# Patient Record
Sex: FEMALE | Race: BLACK OR AFRICAN AMERICAN | Employment: FULL TIME | URBAN - METROPOLITAN AREA
[De-identification: names, ages, dates, MRNs, and addresses within clinical notes are randomized per-mention and may not be internally consistent; named-entity substitution may affect disease eponyms.]

---

## 2022-10-02 ENCOUNTER — HOSPITAL ENCOUNTER (EMERGENCY)
Age: 27
Discharge: HOME | End: 2022-10-02
Attending: EMERGENCY MEDICINE
Payer: COMMERCIAL

## 2022-10-02 VITALS
HEART RATE: 84 BPM | OXYGEN SATURATION: 100 % | TEMPERATURE: 99 F | WEIGHT: 107 LBS | HEIGHT: 64 IN | BODY MASS INDEX: 18.27 KG/M2 | DIASTOLIC BLOOD PRESSURE: 61 MMHG | RESPIRATION RATE: 16 BRPM | SYSTOLIC BLOOD PRESSURE: 95 MMHG

## 2022-10-02 DIAGNOSIS — R10.30 LOWER ABDOMINAL PAIN: Primary | ICD-10-CM

## 2022-10-02 LAB
ALBUMIN SERPL-MCNC: 3.9 G/DL (ref 3.5–5)
ALBUMIN/GLOB SERPL: 1 {RATIO} (ref 1.2–3.5)
ALP SERPL-CCNC: 62 U/L (ref 50–136)
ALT SERPL-CCNC: 31 U/L (ref 12–65)
ANION GAP SERPL CALC-SCNC: 6 MMOL/L (ref 4–13)
AST SERPL-CCNC: 21 U/L (ref 15–37)
BASOPHILS # BLD: 0 K/UL (ref 0–0.2)
BASOPHILS NFR BLD: 0 % (ref 0–2)
BILIRUB SERPL-MCNC: 0.4 MG/DL (ref 0.2–1.1)
BUN SERPL-MCNC: 9 MG/DL (ref 6–23)
CALCIUM SERPL-MCNC: 9 MG/DL (ref 8.3–10.4)
CHLORIDE SERPL-SCNC: 109 MMOL/L (ref 101–110)
CO2 SERPL-SCNC: 29 MMOL/L (ref 21–32)
CREAT SERPL-MCNC: 0.6 MG/DL (ref 0.6–1)
DIFFERENTIAL METHOD BLD: ABNORMAL
EOSINOPHIL # BLD: 0.2 K/UL (ref 0–0.8)
EOSINOPHIL NFR BLD: 3 % (ref 0.5–7.8)
ERYTHROCYTE [DISTWIDTH] IN BLOOD BY AUTOMATED COUNT: 11.9 % (ref 11.9–14.6)
GLOBULIN SER CALC-MCNC: 3.9 G/DL (ref 2.3–3.5)
GLUCOSE SERPL-MCNC: 78 MG/DL (ref 65–100)
HCG UR QL: NEGATIVE
HCT VFR BLD AUTO: 36.9 % (ref 35.8–46.3)
HGB BLD-MCNC: 11.6 G/DL (ref 11.7–15.4)
IMM GRANULOCYTES # BLD AUTO: 0 K/UL (ref 0–0.5)
IMM GRANULOCYTES NFR BLD AUTO: 0 % (ref 0–5)
LIPASE SERPL-CCNC: 70 U/L (ref 73–393)
LYMPHOCYTES # BLD: 3 K/UL (ref 0.5–4.6)
LYMPHOCYTES NFR BLD: 55 % (ref 13–44)
MCH RBC QN AUTO: 24.4 PG (ref 26.1–32.9)
MCHC RBC AUTO-ENTMCNC: 31.4 G/DL (ref 31.4–35)
MCV RBC AUTO: 77.7 FL (ref 79.6–97.8)
MONOCYTES # BLD: 0.4 K/UL (ref 0.1–1.3)
MONOCYTES NFR BLD: 8 % (ref 4–12)
NEUTS SEG # BLD: 1.8 K/UL (ref 1.7–8.2)
NEUTS SEG NFR BLD: 34 % (ref 43–78)
NRBC # BLD: 0 K/UL (ref 0–0.2)
PLATELET # BLD AUTO: 96 K/UL (ref 150–450)
PLATELET COMMENT: ABNORMAL
PMV BLD AUTO: 11.8 FL (ref 9.4–12.3)
POTASSIUM SERPL-SCNC: 3.6 MMOL/L (ref 3.5–5.1)
PROT SERPL-MCNC: 7.8 G/DL (ref 6.3–8.2)
RBC # BLD AUTO: 4.75 M/UL (ref 4.05–5.2)
RBC MORPH BLD: ABNORMAL
RBC MORPH BLD: ABNORMAL
SODIUM SERPL-SCNC: 144 MMOL/L (ref 136–145)
WBC # BLD AUTO: 5.4 K/UL (ref 4.3–11.1)
WBC MORPH BLD: ABNORMAL

## 2022-10-02 PROCEDURE — 85025 COMPLETE CBC W/AUTO DIFF WBC: CPT

## 2022-10-02 PROCEDURE — 99284 EMERGENCY DEPT VISIT MOD MDM: CPT

## 2022-10-02 PROCEDURE — 83690 ASSAY OF LIPASE: CPT

## 2022-10-02 PROCEDURE — 80053 COMPREHEN METABOLIC PANEL: CPT

## 2022-10-02 PROCEDURE — 81025 URINE PREGNANCY TEST: CPT

## 2022-10-02 PROCEDURE — 6360000002 HC RX W HCPCS: Performed by: EMERGENCY MEDICINE

## 2022-10-02 PROCEDURE — 96374 THER/PROPH/DIAG INJ IV PUSH: CPT

## 2022-10-02 RX ORDER — KETOROLAC TROMETHAMINE 30 MG/ML
30 INJECTION, SOLUTION INTRAMUSCULAR; INTRAVENOUS ONCE
Status: COMPLETED | OUTPATIENT
Start: 2022-10-02 | End: 2022-10-02

## 2022-10-02 RX ORDER — KETOROLAC TROMETHAMINE 10 MG/1
10 TABLET, FILM COATED ORAL EVERY 6 HOURS PRN
Qty: 20 TABLET | Refills: 0 | Status: SHIPPED | OUTPATIENT
Start: 2022-10-02

## 2022-10-02 RX ADMIN — KETOROLAC TROMETHAMINE 30 MG: 30 INJECTION, SOLUTION INTRAMUSCULAR at 10:20

## 2022-10-02 ASSESSMENT — PAIN DESCRIPTION - LOCATION: LOCATION: ABDOMEN

## 2022-10-02 ASSESSMENT — PAIN - FUNCTIONAL ASSESSMENT: PAIN_FUNCTIONAL_ASSESSMENT: 0-10

## 2022-10-02 ASSESSMENT — PAIN SCALES - GENERAL
PAINLEVEL_OUTOF10: 7
PAINLEVEL_OUTOF10: 6

## 2022-10-02 ASSESSMENT — ENCOUNTER SYMPTOMS
ABDOMINAL PAIN: 1
RESPIRATORY NEGATIVE: 1

## 2022-10-02 ASSESSMENT — PAIN DESCRIPTION - ORIENTATION: ORIENTATION: LOWER

## 2022-10-02 ASSESSMENT — PAIN DESCRIPTION - DESCRIPTORS: DESCRIPTORS: CRAMPING

## 2022-10-02 NOTE — DISCHARGE INSTRUCTIONS
Follow-up with your doctor. take medicines as prescribed. Return to emergency department for any concerns.

## 2022-10-02 NOTE — ED TRIAGE NOTES
Pt arrives via pov with complaints of lower abdominal pain. LMP last week. Denies N/V/D. Recent miscarriage in July with D&C.

## 2022-10-02 NOTE — ED NOTES
I have reviewed discharge instructions with the patient. The patient verbalized understanding. Patient left ED via Discharge Method: ambulatory to Home with self    Opportunity for questions and clarification provided. Patient given 1 scripts. No esign        To continue your aftercare when you leave the hospital, you may receive an automated call from our care team to check in on how you are doing. This is a free service and part of our promise to provide the best care and service to meet your aftercare needs.  If you have questions, or wish to unsubscribe from this service please call [phone]. Thank you for Choosing our [facility] Emergency Department.       Atif Pérez RN  10/02/22 9606

## 2022-10-02 NOTE — ED PROVIDER NOTES
Emergency Department Provider Note                   PCP:                NOT ON FILE               Age: 32 y.o. Sex: female       ICD-10-CM    1. Lower abdominal pain  R10.30           DISPOSITION Decision To Discharge 10/02/2022 11:08:56 AM        MDM  Number of Diagnoses or Management Options  Diagnosis management comments: 19-year-old -American female present emergency department with lower abdominal cramping that been intermittent for the past month. Patient with no pain upon evaluation today. She states that it does not hurt for palpation but she does have some mild cramps at this time. She was given IV Toradol which completely resolved her symptoms. Patient will be discharged home with p.o. Toradol. She will return to the emergency department for any concerns. Amount and/or Complexity of Data Reviewed  Clinical lab tests: ordered and reviewed  Decide to obtain previous medical records or to obtain history from someone other than the patient: yes  Review and summarize past medical records: yes    Patient Progress  Patient progress: improved       ED Course as of 10/02/22 1110   Sun Oct 02, 2022   1105 Patient is feeling better.  [JL]      ED Course User Index  [JL] Lilibeth Houser MD        Orders Placed This Encounter   Procedures    CBC with Diff    CMP    Lipase    Diet NPO    POCT Urine Dipstick    POC Pregnancy Urine Qual    POC Pregnancy Urine Qual    Saline lock IV        Medications   ketorolac (TORADOL) injection 30 mg (30 mg IntraVENous Given 10/2/22 1020)       New Prescriptions    KETOROLAC (TORADOL) 10 MG TABLET    Take 1 tablet by mouth every 6 hours as needed for Pain        Warren Vilchis is a 32 y.o. female who presents to the Emergency Department with chief complaint of    Chief Complaint   Patient presents with    Abdominal Pain      19-year-old -American female presented to the emergency department with complaints of lower abdominal cramping that has been intermittent for the past month. Patient states that she had a miscarriage 4 months ago and she has had intermittent cramping since then. With her miscarriage she had a D&C and she has fully recovered from that. Patient states that she just finished her menstrual period yesterday and she has no complaints of vaginal bleeding, vaginal discharge, pelvic pain. She denies any changes in bowel or bladder habits. She states that she has been seen by her family doctor and had a pelvic exam and had a negative STD work-up at that time. She then was seen at [facility] and had a normal work-up there as well. She was given ibuprofen and Levsin for cramping symptoms but they do not seem to help. He has no other complaints at this time. The history is provided by the patient and medical records. Review of Systems   Constitutional: Negative. HENT: Negative. Respiratory: Negative. Cardiovascular: Negative. Gastrointestinal:  Positive for abdominal pain. Genitourinary:  Positive for pelvic pain. Musculoskeletal: Negative. Neurological: Negative. Psychiatric/Behavioral: Negative. All other systems reviewed and are negative. Past Medical History:   Diagnosis Date    Sickle cell anemia ([facility] [address]         History reviewed. No pertinent surgical history. History reviewed. No pertinent family history. Social History     Socioeconomic History    Marital status:      Spouse name: None    Number of children: None    Years of education: None    Highest education level: None   Tobacco Use    Smoking status: Never    Smokeless tobacco: Never   Substance and Sexual Activity    Alcohol use: Never    Drug use: Never         Patient has no known allergies. Previous Medications    No medications on file        Vitals signs and nursing note reviewed.    Patient Vitals for the past 4 hrs:   Temp Pulse Resp BP SpO2   10/02/22 0940 99 °F (37.2 °C) 84 16 123/77 100 %          Physical Exam  Vitals and nursing note reviewed. Constitutional:       General: She is not in acute distress. Appearance: She is well-developed. She is not ill-appearing or toxic-appearing. HENT:      Head: Normocephalic and atraumatic. Mouth/Throat:      Mouth: Mucous membranes are moist.   Cardiovascular:      Rate and Rhythm: Normal rate and regular rhythm. Heart sounds: Normal heart sounds. Pulmonary:      Effort: Pulmonary effort is normal.      Breath sounds: Normal breath sounds. Abdominal:      General: Abdomen is flat. Bowel sounds are normal.      Palpations: Abdomen is soft. Tenderness: There is no abdominal tenderness. Skin:     General: Skin is warm and dry. Neurological:      General: No focal deficit present. Mental Status: She is alert and oriented to person, place, and time.    Psychiatric:         Mood and Affect: Mood normal.         Behavior: Behavior normal.            Results for orders placed or performed during the hospital encounter of 10/02/22   CBC with Diff   Result Value Ref Range    WBC 5.4 4.3 - 11.1 K/uL    RBC 4.75 4.05 - 5.2 M/uL    Hemoglobin 11.6 (L) 11.7 - 15.4 g/dL    Hematocrit 36.9 35.8 - 46.3 %    MCV 77.7 (L) 79.6 - 97.8 FL    MCH 24.4 (L) 26.1 - 32.9 PG    MCHC 31.4 31.4 - 35.0 g/dL    RDW 11.9 11.9 - 14.6 %    Platelets 96 (L) 986 - 450 K/uL    MPV 11.8 9.4 - 12.3 FL    nRBC 0.00 0.0 - 0.2 K/uL    Differential Type AUTOMATED      Seg Neutrophils 34 (L) 43 - 78 %    Lymphocytes 55 (H) 13 - 44 %    Monocytes 8 4.0 - 12.0 %    Eosinophils % 3 0.5 - 7.8 %    Basophils 0 0.0 - 2.0 %    Immature Granulocytes 0 0.0 - 5.0 %    Segs Absolute 1.9 1.7 - 8.2 K/UL    Absolute Lymph # 3.0 0.5 - 4.6 K/UL    Absolute Mono # 0.4 0.1 - 1.3 K/UL    Absolute Eos # 0.1 0.0 - 0.8 K/UL    Basophils Absolute 0.0 0.0 - 0.2 K/UL    Absolute Immature Granulocyte 0.0 0.0 - 0.5 K/UL   CMP   Result Value Ref Range    Sodium 144 136 - 145 mmol/L    Potassium 3.6 3.5 - 5.1 mmol/L    Chloride 109 101 - 110 mmol/L    CO2 29 21 - 32 mmol/L    Anion Gap 6 4 - 13 mmol/L    Glucose 78 65 - 100 mg/dL    BUN 9 6 - 23 MG/DL    Creatinine 0.60 0.6 - 1.0 MG/DL    GFR African American >60 >60 ml/min/1.73m2    GFR Non- >60 >60 ml/min/1.73m2    Calcium 9.0 8.3 - 10.4 MG/DL    Total Bilirubin 0.4 0.2 - 1.1 MG/DL    ALT 31 12 - 65 U/L    AST 21 15 - 37 U/L    Alk Phosphatase 62 50 - 136 U/L    Total Protein 7.8 6.3 - 8.2 g/dL    Albumin 3.9 3.5 - 5.0 g/dL    Globulin 3.9 (H) 2.3 - 3.5 g/dL    Albumin/Globulin Ratio 1.0 (L) 1.2 - 3.5     Lipase   Result Value Ref Range    Lipase 70 (L) 73 - 393 U/L   POC Pregnancy Urine Qual   Result Value Ref Range    Preg Test, Ur Negative NEG          No orders to display                       Voice dictation software was used during the making of this note. This software is not perfect and grammatical and other typographical errors may be present. This note has not been completely proofread for errors.        Leo Chaidez MD  10/02/22 1363

## 2023-12-14 ENCOUNTER — OFFICE VISIT (OUTPATIENT)
Dept: ORTHOPEDIC SURGERY | Age: 28
End: 2023-12-14
Payer: COMMERCIAL

## 2023-12-14 DIAGNOSIS — M79.672 LEFT FOOT PAIN: ICD-10-CM

## 2023-12-14 DIAGNOSIS — M25.571 CHRONIC PAIN OF BOTH ANKLES: ICD-10-CM

## 2023-12-14 DIAGNOSIS — M25.572 CHRONIC PAIN OF BOTH ANKLES: ICD-10-CM

## 2023-12-14 DIAGNOSIS — G89.29 CHRONIC PAIN OF BOTH ANKLES: ICD-10-CM

## 2023-12-14 DIAGNOSIS — M79.671 RIGHT FOOT PAIN: Primary | ICD-10-CM

## 2023-12-14 PROCEDURE — 99204 OFFICE O/P NEW MOD 45 MIN: CPT | Performed by: ORTHOPAEDIC SURGERY

## 2023-12-14 NOTE — PROGRESS NOTES
biggest issue standing and walking are right plantarlateral foot pain, bent knees and somewhat crouch gait  Her biggest issue with day-to-day activity is plantar lateral right foot pain  She failed insoles from [employer]  She has enough changes in her lateral column plantar lateral foot overload on x-ray that we discussed revision surgery  She understands my partner will have to give his opinion on whether he feels surgery will help her    Advanced medical imaging: Will leave CT scan versus MRI scan up to partner   We discussed care and protection  PT: She completed PT after her prior treatment [facility] [place] [facility]  Orthotic/prosthetic: She reports seeing [employer] for custom insoles that did not relieve her pain       Medication - OTC meds prn:   Surgical discussion: She understands that due to my upper extremity conditions, the need to see a surgical partner for surgery. I outlined surgery with certain risks/complications and expected post-op course. My opined surgical recommendation and surgical discussion may not align exactly with my surgical partner's opinion; therefore, my partner's recommendation is what should be accepted and followed. Surgery: Right revision foot surgery: Arch reconstruction: Triple arthrodesis: Fifth tarsometatarsal decompression: Balderrama-tenderness Achilles recession      Follow up: Surgical partner  Work status: Regular     This note was created using Dragon voice recognition software which may result in errors of speech and spelling recognition and word/phrase syntax errors.

## 2023-12-27 ENCOUNTER — CLINICAL DOCUMENTATION (OUTPATIENT)
Dept: ORTHOPEDIC SURGERY | Age: 28
End: 2023-12-27

## 2023-12-28 DIAGNOSIS — M76.821 TIBIALIS TENDINITIS OF BOTH LOWER EXTREMITIES: Primary | ICD-10-CM

## 2023-12-28 DIAGNOSIS — M76.822 TIBIALIS TENDINITIS OF BOTH LOWER EXTREMITIES: Primary | ICD-10-CM

## 2023-12-28 DIAGNOSIS — G80.1 SPASTIC DIPLEGIC CEREBRAL PALSY (HCC): ICD-10-CM

## 2024-01-02 ENCOUNTER — CLINICAL DOCUMENTATION (OUTPATIENT)
Dept: ORTHOPEDIC SURGERY | Age: 29
End: 2024-01-02

## 2024-01-09 RX ORDER — PROPRANOLOL HCL 60 MG
CAPSULE, EXTENDED RELEASE 24HR ORAL
COMMUNITY
Start: 2023-12-15

## 2024-01-09 NOTE — PERIOP NOTE
Patient verified name and .  Order for consent not found in EHR patient verifies procedure.   Type 1B surgery, Phone assessment complete.  Orders Not received.  Labs per surgeon: none  Labs per anesthesia protocol: none    Patient answered medical/surgical history questions at their best of ability. All prior to admission medications documented in EPIC.  Patient instructed to take the following medications the day of surgery according to anesthesia guidelines with a small sip of water: Propranolol (Inderal),  On the day before surgery please take 2 Tylenol in the morning and then again before bed. You may use either regular or extra strength.   Hold all vitamins 7 days prior to surgery and NSAIDS 5 days prior to surgery. Prescription meds to hold:None  Patient instructed on the following:    > Arrive at [facility]PC Entrance, time of arrival to be called the day before by 1700  > NPO after midnight, unless otherwise indicated, including gum, mints, and ice chips  > Responsible adult must drive patient to the hospital, stay during surgery, and patient will need supervision 24 hours after anesthesia  > Use non moisturizing soap in shower the night before surgery and on the morning of surgery  > All piercings must be removed prior to arrival.    > Leave all valuables (money and jewelry) at home but bring insurance card and ID on DOS.   > You may be required to pay a deductible or co-pay on the day of your procedure. You can pre-pay by calling [phone] if your surgery is at the [facility] or [phone] if your surgery is at the [facility].  > Do not wear make-up, nail polish, lotions, cologne, perfumes, powders, or oil on skin. Artificial nails are not permitted.

## 2024-01-10 ENCOUNTER — ANESTHESIA EVENT (OUTPATIENT)
Dept: SURGERY | Age: 29
End: 2024-01-10
Payer: COMMERCIAL

## 2024-01-10 NOTE — PERIOP NOTE
Preop department called to notify patient of arrival time for scheduled procedure. Instructions given to   - Arrive at OPC Entrance [address]  - Remain NPO after midnight, unless otherwise indicated, including gum, mints, and ice chips.   - Have a responsible adult to drive patient to the hospital, stay during surgery, and patient will need supervision 24 hours after anesthesia.   - Use antibacterial soap in shower the night before surgery and on the morning of surgery.       Was patient contacted: yes, pt  Voicemail left: n/a  Numbers contacted: [phone]   Arrival time: 1100

## 2024-01-11 ENCOUNTER — HOSPITAL ENCOUNTER (OUTPATIENT)
Age: 29
Setting detail: OUTPATIENT SURGERY
Discharge: HOME | End: 2024-01-11
Attending: ORTHOPAEDIC SURGERY | Admitting: ORTHOPAEDIC SURGERY
Payer: COMMERCIAL

## 2024-01-11 ENCOUNTER — ANESTHESIA (OUTPATIENT)
Dept: SURGERY | Age: 29
End: 2024-01-11
Payer: COMMERCIAL

## 2024-01-11 ENCOUNTER — APPOINTMENT (OUTPATIENT)
Dept: GENERAL RADIOLOGY | Age: 29
End: 2024-01-11
Attending: ORTHOPAEDIC SURGERY
Payer: COMMERCIAL

## 2024-01-11 VITALS
TEMPERATURE: 98.1 F | HEIGHT: 64 IN | RESPIRATION RATE: 13 BRPM | DIASTOLIC BLOOD PRESSURE: 92 MMHG | SYSTOLIC BLOOD PRESSURE: 145 MMHG | HEART RATE: 60 BPM | OXYGEN SATURATION: 100 % | BODY MASS INDEX: 19.29 KG/M2 | WEIGHT: 113 LBS

## 2024-01-11 DIAGNOSIS — G89.18 ACUTE POST-OPERATIVE PAIN: Primary | ICD-10-CM

## 2024-01-11 LAB — HCG UR QL: NEGATIVE

## 2024-01-11 PROCEDURE — 3700000000 HC ANESTHESIA ATTENDED CARE: Performed by: ORTHOPAEDIC SURGERY

## 2024-01-11 PROCEDURE — 3600000014 HC SURGERY LEVEL 4 ADDTL 15MIN: Performed by: ORTHOPAEDIC SURGERY

## 2024-01-11 PROCEDURE — 2580000003 HC RX 258: Performed by: ANESTHESIOLOGY

## 2024-01-11 PROCEDURE — 2720000010 HC SURG SUPPLY STERILE: Performed by: ORTHOPAEDIC SURGERY

## 2024-01-11 PROCEDURE — 2500000003 HC RX 250 WO HCPCS: Performed by: NURSE ANESTHETIST, CERTIFIED REGISTERED

## 2024-01-11 PROCEDURE — C1734 ORTH/DEVIC/DRUG BN/BN,TIS/BN: HCPCS | Performed by: ORTHOPAEDIC SURGERY

## 2024-01-11 PROCEDURE — 7100000010 HC PHASE II RECOVERY - FIRST 15 MIN: Performed by: ORTHOPAEDIC SURGERY

## 2024-01-11 PROCEDURE — 3600000004 HC SURGERY LEVEL 4 BASE: Performed by: ORTHOPAEDIC SURGERY

## 2024-01-11 PROCEDURE — 6360000002 HC RX W HCPCS: Performed by: ANESTHESIOLOGY

## 2024-01-11 PROCEDURE — 7100000000 HC PACU RECOVERY - FIRST 15 MIN: Performed by: ORTHOPAEDIC SURGERY

## 2024-01-11 PROCEDURE — 3700000001 HC ADD 15 MINUTES (ANESTHESIA): Performed by: ORTHOPAEDIC SURGERY

## 2024-01-11 PROCEDURE — C1889 IMPLANT/INSERT DEVICE, NOC: HCPCS | Performed by: ORTHOPAEDIC SURGERY

## 2024-01-11 PROCEDURE — 2709999900 HC NON-CHARGEABLE SUPPLY: Performed by: ORTHOPAEDIC SURGERY

## 2024-01-11 PROCEDURE — 6360000002 HC RX W HCPCS

## 2024-01-11 PROCEDURE — 6360000002 HC RX W HCPCS: Performed by: NURSE ANESTHETIST, CERTIFIED REGISTERED

## 2024-01-11 PROCEDURE — 64445 NJX AA&/STRD SCIATIC NRV IMG: CPT | Performed by: ANESTHESIOLOGY

## 2024-01-11 PROCEDURE — 64447 NJX AA&/STRD FEMORAL NRV IMG: CPT | Performed by: ANESTHESIOLOGY

## 2024-01-11 PROCEDURE — 6360000002 HC RX W HCPCS: Performed by: NURSE PRACTITIONER

## 2024-01-11 PROCEDURE — 81025 URINE PREGNANCY TEST: CPT

## 2024-01-11 PROCEDURE — C1769 GUIDE WIRE: HCPCS | Performed by: ORTHOPAEDIC SURGERY

## 2024-01-11 PROCEDURE — 7100000001 HC PACU RECOVERY - ADDTL 15 MIN: Performed by: ORTHOPAEDIC SURGERY

## 2024-01-11 PROCEDURE — C1713 ANCHOR/SCREW BN/BN,TIS/BN: HCPCS | Performed by: ORTHOPAEDIC SURGERY

## 2024-01-11 DEVICE — GRAFT BNE INJ 3 CC AUG: Type: IMPLANTABLE DEVICE | Site: FOOT | Status: FUNCTIONAL

## 2024-01-11 DEVICE — ALLOGRAFT BNE CHIP 1-4 MM 15 CC CRUSH CANC: Type: IMPLANTABLE DEVICE | Site: FOOT | Status: FUNCTIONAL

## 2024-01-11 DEVICE — IMPLANTABLE DEVICE
Type: IMPLANTABLE DEVICE | Site: FOOT | Status: FUNCTIONAL
Brand: EASYFUSE™

## 2024-01-11 DEVICE — CANNULATED SCREW - 16MM THREAD
Type: IMPLANTABLE DEVICE | Site: FOOT | Status: FUNCTIONAL
Brand: AXSOS 3

## 2024-01-11 RX ORDER — HYDROMORPHONE HYDROCHLORIDE 2 MG/ML
0.5 INJECTION, SOLUTION INTRAMUSCULAR; INTRAVENOUS; SUBCUTANEOUS EVERY 10 MIN PRN
Status: DISCONTINUED | OUTPATIENT
Start: 2024-01-11 | End: 2024-01-11 | Stop reason: HOSPADM

## 2024-01-11 RX ORDER — PROPOFOL 10 MG/ML
INJECTION, EMULSION INTRAVENOUS CONTINUOUS PRN
Status: DISCONTINUED | OUTPATIENT
Start: 2024-01-11 | End: 2024-01-11 | Stop reason: SDUPTHER

## 2024-01-11 RX ORDER — EPHEDRINE SULFATE/0.9% NACL/PF 50 MG/5 ML
SYRINGE (ML) INTRAVENOUS PRN
Status: DISCONTINUED | OUTPATIENT
Start: 2024-01-11 | End: 2024-01-11 | Stop reason: SDUPTHER

## 2024-01-11 RX ORDER — SODIUM CHLORIDE 9 MG/ML
INJECTION, SOLUTION INTRAVENOUS PRN
Status: DISCONTINUED | OUTPATIENT
Start: 2024-01-11 | End: 2024-01-11 | Stop reason: HOSPADM

## 2024-01-11 RX ORDER — IPRATROPIUM BROMIDE AND ALBUTEROL SULFATE 2.5; .5 MG/3ML; MG/3ML
1 SOLUTION RESPIRATORY (INHALATION)
Status: DISCONTINUED | OUTPATIENT
Start: 2024-01-11 | End: 2024-01-11 | Stop reason: HOSPADM

## 2024-01-11 RX ORDER — ASPIRIN 81 MG/1
81 TABLET ORAL 2 TIMES DAILY
Qty: 84 TABLET | Refills: 0 | Status: SHIPPED | OUTPATIENT
Start: 2024-01-11

## 2024-01-11 RX ORDER — DEXAMETHASONE SODIUM PHOSPHATE 4 MG/ML
INJECTION, SOLUTION INTRA-ARTICULAR; INTRALESIONAL; INTRAMUSCULAR; INTRAVENOUS; SOFT TISSUE
Status: DISCONTINUED | OUTPATIENT
Start: 2024-01-11 | End: 2024-01-11 | Stop reason: SDUPTHER

## 2024-01-11 RX ORDER — LIDOCAINE HYDROCHLORIDE 20 MG/ML
INJECTION, SOLUTION EPIDURAL; INFILTRATION; INTRACAUDAL; PERINEURAL PRN
Status: DISCONTINUED | OUTPATIENT
Start: 2024-01-11 | End: 2024-01-11 | Stop reason: SDUPTHER

## 2024-01-11 RX ORDER — LIDOCAINE HYDROCHLORIDE 10 MG/ML
1 INJECTION, SOLUTION INFILTRATION; PERINEURAL
Status: DISCONTINUED | OUTPATIENT
Start: 2024-01-11 | End: 2024-01-11 | Stop reason: HOSPADM

## 2024-01-11 RX ORDER — SODIUM CHLORIDE 0.9 % (FLUSH) 0.9 %
5-40 SYRINGE (ML) INJECTION EVERY 12 HOURS SCHEDULED
Status: DISCONTINUED | OUTPATIENT
Start: 2024-01-11 | End: 2024-01-11 | Stop reason: HOSPADM

## 2024-01-11 RX ORDER — FENTANYL CITRATE 50 UG/ML
50 INJECTION, SOLUTION INTRAMUSCULAR; INTRAVENOUS EVERY 5 MIN PRN
Status: DISCONTINUED | OUTPATIENT
Start: 2024-01-11 | End: 2024-01-11 | Stop reason: HOSPADM

## 2024-01-11 RX ORDER — ACETAMINOPHEN 500 MG
1000 TABLET ORAL ONCE
Status: DISCONTINUED | OUTPATIENT
Start: 2024-01-11 | End: 2024-01-11 | Stop reason: HOSPADM

## 2024-01-11 RX ORDER — OXYCODONE HYDROCHLORIDE 5 MG/1
5 TABLET ORAL EVERY 6 HOURS PRN
Qty: 20 TABLET | Refills: 0 | Status: SHIPPED | OUTPATIENT
Start: 2024-01-11 | End: 2024-01-16

## 2024-01-11 RX ORDER — ONDANSETRON 2 MG/ML
4 INJECTION INTRAMUSCULAR; INTRAVENOUS
Status: DISCONTINUED | OUTPATIENT
Start: 2024-01-11 | End: 2024-01-11 | Stop reason: HOSPADM

## 2024-01-11 RX ORDER — CEPHALEXIN 500 MG/1
500 CAPSULE ORAL 4 TIMES DAILY
Qty: 12 CAPSULE | Refills: 0 | Status: SHIPPED | OUTPATIENT
Start: 2024-01-11

## 2024-01-11 RX ORDER — SODIUM CHLORIDE, SODIUM LACTATE, POTASSIUM CHLORIDE, CALCIUM CHLORIDE 600; 310; 30; 20 MG/100ML; MG/100ML; MG/100ML; MG/100ML
INJECTION, SOLUTION INTRAVENOUS CONTINUOUS
Status: DISCONTINUED | OUTPATIENT
Start: 2024-01-11 | End: 2024-01-11 | Stop reason: HOSPADM

## 2024-01-11 RX ORDER — SODIUM CHLORIDE 0.9 % (FLUSH) 0.9 %
5-40 SYRINGE (ML) INJECTION PRN
Status: DISCONTINUED | OUTPATIENT
Start: 2024-01-11 | End: 2024-01-11 | Stop reason: HOSPADM

## 2024-01-11 RX ORDER — ROPIVACAINE HYDROCHLORIDE 5 MG/ML
INJECTION, SOLUTION EPIDURAL; INFILTRATION; PERINEURAL
Status: DISCONTINUED | OUTPATIENT
Start: 2024-01-11 | End: 2024-01-11 | Stop reason: SDUPTHER

## 2024-01-11 RX ORDER — BUPIVACAINE HYDROCHLORIDE 5 MG/ML
INJECTION, SOLUTION EPIDURAL; INTRACAUDAL
Status: DISCONTINUED | OUTPATIENT
Start: 2024-01-11 | End: 2024-01-11 | Stop reason: SDUPTHER

## 2024-01-11 RX ORDER — OXYCODONE HYDROCHLORIDE 5 MG/1
5 TABLET ORAL
Status: DISCONTINUED | OUTPATIENT
Start: 2024-01-11 | End: 2024-01-11 | Stop reason: HOSPADM

## 2024-01-11 RX ORDER — FENTANYL CITRATE 50 UG/ML
INJECTION, SOLUTION INTRAMUSCULAR; INTRAVENOUS
Status: COMPLETED
Start: 2024-01-11 | End: 2024-01-11

## 2024-01-11 RX ORDER — MIDAZOLAM HYDROCHLORIDE 2 MG/2ML
2 INJECTION, SOLUTION INTRAMUSCULAR; INTRAVENOUS
Status: COMPLETED | OUTPATIENT
Start: 2024-01-11 | End: 2024-01-11

## 2024-01-11 RX ORDER — HALOPERIDOL 5 MG/ML
1 INJECTION INTRAMUSCULAR
Status: DISCONTINUED | OUTPATIENT
Start: 2024-01-11 | End: 2024-01-11 | Stop reason: HOSPADM

## 2024-01-11 RX ADMIN — SODIUM CHLORIDE, POTASSIUM CHLORIDE, SODIUM LACTATE AND CALCIUM CHLORIDE: 600; 310; 30; 20 INJECTION, SOLUTION INTRAVENOUS at 11:01

## 2024-01-11 RX ADMIN — BUPIVACAINE HYDROCHLORIDE 25 ML: 5 INJECTION, SOLUTION EPIDURAL; INTRACAUDAL; PERINEURAL at 10:54

## 2024-01-11 RX ADMIN — Medication 2000 MG: at 11:49

## 2024-01-11 RX ADMIN — MIDAZOLAM 2 MG: 1 INJECTION INTRAMUSCULAR; INTRAVENOUS at 10:59

## 2024-01-11 RX ADMIN — PROPOFOL 100 MCG/KG/MIN: 10 INJECTION, EMULSION INTRAVENOUS at 11:45

## 2024-01-11 RX ADMIN — LIDOCAINE HYDROCHLORIDE 100 MG: 20 INJECTION, SOLUTION EPIDURAL; INFILTRATION; INTRACAUDAL; PERINEURAL at 11:45

## 2024-01-11 RX ADMIN — Medication 10 MG: at 12:24

## 2024-01-11 RX ADMIN — ROPIVACAINE HYDROCHLORIDE 20 ML: 5 INJECTION, SOLUTION EPIDURAL; INFILTRATION; PERINEURAL at 11:06

## 2024-01-11 RX ADMIN — FENTANYL CITRATE 100 MCG: 50 INJECTION, SOLUTION INTRAMUSCULAR; INTRAVENOUS at 10:59

## 2024-01-11 RX ADMIN — PROPOFOL 20 MG: 10 INJECTION, EMULSION INTRAVENOUS at 11:50

## 2024-01-11 RX ADMIN — PROPOFOL 20 MG: 10 INJECTION, EMULSION INTRAVENOUS at 11:46

## 2024-01-11 RX ADMIN — DEXAMETHASONE SODIUM PHOSPHATE 4 MG: 4 INJECTION, SOLUTION INTRAMUSCULAR; INTRAVENOUS at 11:06

## 2024-01-11 RX ADMIN — Medication 10 MG: at 11:54

## 2024-01-11 ASSESSMENT — PAIN - FUNCTIONAL ASSESSMENT: PAIN_FUNCTIONAL_ASSESSMENT: 0-10

## 2024-01-11 NOTE — PERIOP NOTE
PACU DISCHARGE NOTE    Pt and family educated on discharge intructions. No additional questions at this time.Vital signs stable, pain well controlled, alert and oriented times three or at baseline, follow up per surgeon, no anesthetic complications.   Pt and family state they have a knee scooter at home

## 2024-01-11 NOTE — DISCHARGE INSTRUCTIONS
discontinued, doses are      changed, or new medications (including over-the-counter products) are added.  *  Please carry medication information at all times in case of emergency situations.    These are general instructions for a healthy lifestyle:  No smoking/ No tobacco products/ Avoid exposure to second hand smoke  Surgeon General's Warning:  Quitting smoking now greatly reduces serious risk to your health.  Obesity, smoking, and sedentary lifestyle greatly increases your risk for illness  A healthy diet, regular physical exercise & weight monitoring are important for maintaining a healthy lifestyle    You may be retaining fluid if you have a history of heart failure or if you experience any of the following symptoms:  Weight gain of 3 pounds or more overnight or 5 pounds in a week, increased swelling in our hands or feet or shortness of breath while lying flat in bed.  Please call your doctor as soon as you notice any of these symptoms; do not wait until your next office visit.

## 2024-01-11 NOTE — ANESTHESIA POSTPROCEDURE EVALUATION
Department of Anesthesiology  Postprocedure Note    Patient: Kristy Muhammad  MRN: [id number]  YOB: 1995  Date of evaluation: 1/11/2024    Procedure Summary     Date: 01/11/24 Room / Location: [facility] OP OR 02 / [facility]    Anesthesia Start: 1139 Anesthesia Stop: 1250    Procedures:       Right Triple Arthrodesis with (Right: Foot)      right achilles lengthening (Right: Foot) Diagnosis:       Tibialis tendinitis of both lower extremities      Spastic diplegic cerebral palsy (HCC)      (Tibialis tendinitis of both lower extremities [M76.821, M76.822])      (Spastic diplegic cerebral palsy (HCC) [G80.1])    Surgeons: Juliano Mireles III, MD Responsible Provider: Sergey Jacobs MD    Anesthesia Type: MAC ASA Status: 3          Anesthesia Type: MAC    Berna Phase I: Berna Score: 7    Berna Phase II: Berna Score: 9    Anesthesia Post Evaluation    Patient location during evaluation: PACU  Patient participation: complete - patient participated  Level of consciousness: awake and alert  Airway patency: patent  Nausea & Vomiting: no nausea and no vomiting  Cardiovascular status: hemodynamically stable  Respiratory status: acceptable, nonlabored ventilation and spontaneous ventilation  Hydration status: euvolemic  Comments: BP (!) 145/92   Pulse 60   Temp 98.1 °F (36.7 °C)   Resp 13   Ht 1.626 m (5' 4\")   Wt 51.3 kg (113 lb)   LMP 01/07/2024   SpO2 100%   BMI 19.40 kg/m²     Multimodal analgesia pain management approach  Pain management: adequate and satisfactory to patient        No notable events documented.

## 2024-01-11 NOTE — H&P
Outpatient Surgery History and Physical:  Kristy Muhammad was seen and examined.    CHIEF COMPLAINT:   right foot.     PE:   Ht 1.626 m (5' 4\")   Wt 51.3 kg (113 lb)   LMP 01/07/2024   BMI 19.40 kg/m²     Heart:   Regular rhythm      Lungs:  Are clear      Past Medical History:    Past Medical History:   Diagnosis Date    Sickle cell anemia (HCC)        Surgical History: History reviewed. No pertinent surgical history.    Social History: Patient  reports that she has never smoked. She has never used smokeless tobacco. She reports that she does not drink alcohol and does not use drugs.    Family History: History reviewed. No pertinent family history.    Allergies: Reviewed per EMR  Fremanezumab-vfrm, Galcanezumab-gnlm, Verapamil, and Loratadine    Medications:    Prior to Admission medications    Medication Sig Start Date End Date Taking? Authorizing Provider   propranolol (INDERAL LA) 60 MG extended release capsule  12/15/23  Yes Provider, MD Domitila   vitamin B-2 (RIBOFLAVIN) 100 MG TABS tablet     Provider, MD Domitila       The surgery is planned for the Right Triple Arthrodesis with right achilles lengthening.        History and physical has been reviewed. The patient has been examined. There have been no significant clinical changes since the completion of the originally dated History and Physical.  Patient identified by surgeon; surgical site was confirmed by patient and surgeon.      The patient is here today for outpatient surgery. I have examined the patient, no changes are noted in the patient's medical status. Necessity for the procedure/care is still present and the history and physical above is current.  See the office notes for the full long term history of the problem.  Please see the recent office notes for the musculoskeletal examination.    Signed By: SEAN Suarez - ABIGAIL     January 11, 2024 10:34 AM

## 2024-01-11 NOTE — OP NOTE
Operative Note    Patient:Kristy Muhammad  MRN: [id number]    Date Of Surgery: 1/11/2024    Surgeon: Juliano Mireles MD    Assistant Surgeon: None    Pre Op Diagnosis:  Pre-Op Diagnosis Codes:     * Tibialis tendinitis of both lower extremities [M76.821, M76.822]     * Spastic diplegic cerebral palsy (HCC) [G80.1]      Post Op Diagnosis:   same    Procedures Performed:    Right triple hemisection tendo Achilles lengthening, 01753  Right subtalar joint arthrodesis, 79676  Right talonavicular joint arthrodesis, 36118  Implants:   Implant Name Type Inv. Item Serial No.  Lot No. LRB No. Used Action   GRAFT BNE INJ 3 CC AUG - [id number]  GRAFT BNE INJ 3 CC AUG  [employer] [id number] Right 1 Implanted   ALLOGRAFT BNE CHIP 1-4 MM 15 CC CRUSH CANC - [id number]  ALLOGRAFT BNE CHIP 1-4 MM 15 CC CRUSH CANC  [employer] [facility]  Right 1 Implanted   EASYFUSE STAPLE 20X20 NITINOL 2-LEG - [id number]  EASYFUSE STAPLE 20X20 NITINOL 2-LEG  [employer] [id number] Right 2 Implanted   SCREW BNE PARVIN 6.5X80 MM 16 MM THRD AXSOS 3 - [id number]  SCREW BNE PARVIN 6.5X80 MM 16 MM THRD AXSOS 3  [employer] [facility] [id number] Right 1 Implanted       Anesthesia:  Choice    Blood Loss:  minimal    Tourniquet:  Estimated calf 40 minutes    Pre Operative Abx:   Ancef 2g            Pre Operative Course:  Kristy Muhammad is a 28 y.o. female who has a history of previous right calcaneocuboid joint distraction arthrodesis with persistent hindfoot pain.    Operation In Detail:  Patient was evaluated in the preoperative area.  We had a long discussion about the procedure and postoperative protocols.  The patient was then brought back to the operating room suite and placed in the operating room table.  A timeout was taken to identify the patient, procedure being performed, and laterality.  After this the patient was prepped and draped in the normal sterile fashion using a Betadine

## 2024-01-11 NOTE — ANESTHESIA PROCEDURE NOTES
Peripheral Block    Patient location during procedure: pre-op  Reason for block: post-op pain management and at surgeon's request  Start time: 1/11/2024 10:54 AM  End time: 1/11/2024 11:05 AM  Staffing  Performed: anesthesiologist   Anesthesiologist: Sergey Jacobs MD  Performed by: Sergey Jacobs MD  Authorized by: Sergey Jacobs MD    Preanesthetic Checklist  Completed: patient identified, IV checked, site marked, risks and benefits discussed, surgical/procedural consents, equipment checked, pre-op evaluation, timeout performed, anesthesia consent given, oxygen available, monitors applied/VS acknowledged and blood product R/B/A discussed and consented  Peripheral Block   Patient position: supine  Prep: ChloraPrep  Provider prep: mask and sterile gloves  Patient monitoring: cardiac monitor, continuous pulse ox, frequent blood pressure checks, IV access, oxygen and responsive to questions  Block type: Sciatic  Popliteal  Laterality: right  Injection technique: single-shot  Guidance: ultrasound guided  Local infiltration: lidocaine  Infiltration strength: 1 %  Local infiltration: lidocaine  Dose: 3 mL    Needle   Needle type: insulated echogenic nerve stimulator needle   Needle gauge: 20 G  Needle localization: ultrasound guidance  Needle length: 10 cm  Assessment   Injection assessment: negative aspiration for heme, no paresthesia on injection, low pressure verified by pressure monitor, no intravascular symptoms and local visualized surrounding nerve on ultrasound  Paresthesia pain: none  Slow fractionated injection: yes  Hemodynamics: stable  Real-time US image taken/store: yes  Outcomes: uncomplicated    Medications Administered  BUPivacaine (MARCAINE) PF injection 0.5% - Perineural   25 mL - 1/11/2024 10:54:00 AM

## 2024-01-11 NOTE — ANESTHESIA PROCEDURE NOTES
Peripheral Block    Patient location during procedure: pre-op  Reason for block: post-op pain management and at surgeon's request  Start time: 1/11/2024 11:06 AM  End time: 1/11/2024 11:09 AM  Staffing  Performed: anesthesiologist   Anesthesiologist: Sergey Jacobs MD  Performed by: Sergey Jacobs MD  Authorized by: Sergey Jacobs MD    Preanesthetic Checklist  Completed: patient identified, IV checked, site marked, risks and benefits discussed, surgical/procedural consents, equipment checked, pre-op evaluation, timeout performed, anesthesia consent given, oxygen available, monitors applied/VS acknowledged and blood product R/B/A discussed and consented  Peripheral Block   Patient position: supine  Prep: ChloraPrep  Provider prep: sterile gloves and mask  Patient monitoring: continuous pulse ox, cardiac monitor, frequent blood pressure checks, IV access, oxygen and responsive to questions  Block type: Femoral  Adductor canal  Laterality: right  Injection technique: single-shot  Guidance: ultrasound guided  Local infiltration: lidocaine  Infiltration strength: 1 %  Local infiltration: lidocaine  Dose: 3 mL    Needle   Needle type: insulated echogenic nerve stimulator needle   Needle gauge: 20 G  Needle localization: ultrasound guidance  Needle length: 10 cm  Assessment   Injection assessment: negative aspiration for heme, no paresthesia on injection, no intravascular symptoms and low pressure verified by pressure monitor  Paresthesia pain: none  Slow fractionated injection: yes  Hemodynamics: stable  Real-time US image taken/store: yes  Outcomes: uncomplicated    Medications Administered  ropivacaine (NAROPIN) injection 0.5% - Perineural   20 mL - 1/11/2024 11:06:00 AM  dexAMETHasone (DECADRON) injection 4 mg/mL - Perineural   4 mg - 1/11/2024 11:06:00 AM

## 2024-01-11 NOTE — ANESTHESIA PRE PROCEDURE
Department of Anesthesiology  Preprocedure Note       Name:  Kristy Muhammad   Age:  28 y.o.  :  1995                                          MRN:  [id number]         Date:  2024      Surgeon: Surgeon(s):  Juliano Mireles III, MD    Procedure: Procedure(s):  Right Triple Arthrodesis with  right achilles lengthening    Medications prior to admission:   Prior to Admission medications    Medication Sig Start Date End Date Taking? Authorizing Provider   propranolol (INDERAL LA) 60 MG extended release capsule  12/15/23  Yes ProviderDomitila MD   vitamin B-2 (RIBOFLAVIN) 100 MG TABS tablet     Provider, MD Domitila       Current medications:    Current Facility-Administered Medications   Medication Dose Route Frequency Provider Last Rate Last Admin   • ceFAZolin (ANCEF) 2000 mg in sterile water 20 mL IV syringe  2,000 mg IntraVENous On Call to OR Margo Horan APRN - CNP       • lactated ringers IV soln infusion   IntraVENous Continuous Margo Horan APRN - CNP       • sodium chloride flush 0.9 % injection 5-40 mL  5-40 mL IntraVENous 2 times per day Margo Horan APRN - CNP       • sodium chloride flush 0.9 % injection 5-40 mL  5-40 mL IntraVENous PRN Margo Horan APRN - CNP       • 0.9 % sodium chloride infusion   IntraVENous PRN Margo Horan APRN - CNP       • lidocaine 1 % injection 1 mL  1 mL IntraDERmal Once PRN Sergey Jacobs MD       • acetaminophen (TYLENOL) tablet 1,000 mg  1,000 mg Oral Once Sergey Jacobs MD       • famotidine (PEPCID) 20 mg in sodium chloride (PF) 0.9 % 10 mL injection  20 mg IntraVENous Once PRN Sergey Jacobs MD       • lactated ringers IV soln infusion   IntraVENous Continuous Sergey Jacobs  mL/hr at 24 1101 New Bag at 24 1101   • sodium chloride flush 0.9 % injection 5-40 mL  5-40 mL IntraVENous 2 times per day Sergey Jacobs MD       • sodium chloride flush 0.9 % injection 5-40 mL  5-40

## 2024-01-13 DIAGNOSIS — M76.822 TIBIALIS TENDINITIS OF BOTH LOWER EXTREMITIES: Primary | ICD-10-CM

## 2024-01-13 DIAGNOSIS — M76.821 TIBIALIS TENDINITIS OF BOTH LOWER EXTREMITIES: Primary | ICD-10-CM

## 2024-01-13 RX ORDER — NALOXONE HYDROCHLORIDE 2 MG/.4ML
2 INJECTION, SOLUTION INTRAMUSCULAR; SUBCUTANEOUS
Qty: 0.4 ML | Refills: 0 | Status: SHIPPED | OUTPATIENT
Start: 2024-01-13 | End: 2024-01-13

## 2024-01-13 RX ORDER — HYDROCODONE BITARTRATE AND ACETAMINOPHEN 7.5; 325 MG/1; MG/1
1-2 TABLET ORAL EVERY 6 HOURS PRN
Qty: 30 TABLET | Refills: 0 | Status: SHIPPED | OUTPATIENT
Start: 2024-01-13 | End: 2024-01-18

## 2024-01-24 ENCOUNTER — OFFICE VISIT (OUTPATIENT)
Dept: ORTHOPEDIC SURGERY | Age: 29
End: 2024-01-24

## 2024-01-24 DIAGNOSIS — M76.822 TIBIALIS TENDINITIS OF BOTH LOWER EXTREMITIES: Primary | ICD-10-CM

## 2024-01-24 DIAGNOSIS — M79.671 RIGHT FOOT PAIN: ICD-10-CM

## 2024-01-24 DIAGNOSIS — M76.821 TIBIALIS TENDINITIS OF BOTH LOWER EXTREMITIES: Primary | ICD-10-CM

## 2024-01-24 PROCEDURE — 99024 POSTOP FOLLOW-UP VISIT: CPT | Performed by: NURSE PRACTITIONER

## 2024-01-24 NOTE — PROGRESS NOTES
Name: Kristy Muhammad  YOB: 1995  Gender: female  MRN: [id number]    Procedure Performed:  Right triple hemisection tendo Achilles lengthening  Right subtalar joint arthrodesis  Right talonavicular joint arthrodesis      Date of Procedure: 01/11/2024      Subjective: Patient reports she is done well overall since her surgery.  Her pain seems to be well-managed under control at this point postoperatively.  She does have a history of cerebral palsy.      Physical Examination: Incisional areas do appear to be healing well and show no signs of infection at this time.  She has palpable pulses and intact sensation to the foot.  She has no signs of DVT at this time, denies of any calf or behind the knee pain does present with a negative Homans' sign.  Swelling is overall minimal to the affected extremity.  She is able to wiggle her lesser toes without pain.        Imaging:   No imaging reviewed          Assessment:   Status post right triple arthrodesis with Achilles lengthening.  We discussed progression of care today as well as expectations until the next follow-up visit.  Questions concerns were addressed, she verbalized understanding of today's conversation.      Plan:   3 This is stable chronic illness/condition  Treatment at this time: Sutures removed, Steri-Strips and short legged cast was placed today.  Patient will continue be nonweightbearing on the affected extremity.  She was encouraged to continue elevating the affected extremity.  The patient is not allowed to get the cast wet.  DVT prophylaxis will be maintained with daily aspirin therapy.  They will follow-up in 3 weeks sooner if needed for cast off x-ray.    Studies ordered: Foot XR needed @ Next Visit    Weight-bearing status: NWB        Return to work/work restrictions: none  No medications given

## 2024-02-01 ENCOUNTER — CLINICAL DOCUMENTATION (OUTPATIENT)
Dept: ORTHOPEDIC SURGERY | Age: 29
End: 2024-02-01

## 2024-02-07 ENCOUNTER — CLINICAL DOCUMENTATION (OUTPATIENT)
Dept: ORTHOPEDIC SURGERY | Age: 29
End: 2024-02-07

## 2024-02-09 ENCOUNTER — TELEPHONE (OUTPATIENT)
Dept: ORTHOPEDIC SURGERY | Age: 29
End: 2024-02-09

## 2024-02-09 NOTE — TELEPHONE ENCOUNTER
Spoke to Leeann told her patient is currently in case and is non weight bearing follow up on 02/14/2024 for cast removal then will need PT to get strength back up

## 2024-02-09 NOTE — TELEPHONE ENCOUNTER
She left a voicemail message requesting a return call to confirm dates and clarify restrictions and limitations.

## 2024-02-14 ENCOUNTER — OFFICE VISIT (OUTPATIENT)
Dept: ORTHOPEDIC SURGERY | Age: 29
End: 2024-02-14

## 2024-02-14 DIAGNOSIS — M76.821 TIBIALIS TENDINITIS OF BOTH LOWER EXTREMITIES: Primary | ICD-10-CM

## 2024-02-14 DIAGNOSIS — M76.822 TIBIALIS TENDINITIS OF BOTH LOWER EXTREMITIES: Primary | ICD-10-CM

## 2024-02-14 PROCEDURE — M5002 MISC BOOT SOCK: HCPCS | Performed by: NURSE PRACTITIONER

## 2024-02-14 PROCEDURE — 99024 POSTOP FOLLOW-UP VISIT: CPT | Performed by: NURSE PRACTITIONER

## 2024-02-14 NOTE — PROGRESS NOTES
Name: Kristy Muhammad  YOB: 1995  Gender: female  MRN: [id number]    Procedure Performed:  Right triple hemisection tendo Achilles lengthening  Right subtalar joint arthrodesis  Right talonavicular joint arthrodesis        Date of Procedure: 01/11/2024      Subjective: Patient reports she is done well since her last visit.  She is happy about progressing with her recovery and getting out of the cast today.      Physical Examination: Incisional areas appear to be well-healed at this point in time with out any signs of infection to the foot or ankle today.  Steri-Strips are still in place after cast removal today.  She has no signs of DVT at this time, denies of any calf or behind the knee pain does present with a negative Homans' sign.  She is palpable pulses and intact sensation to the foot.  She has normal tibiotalar motion and does not have any pain with dorsi or plantarflexion's of the foot.  She has limited to no subtalar motion as expected.  Her swelling is minimal yet noted to the foot dorsally.        Imaging:   Interpretation of imaging  Right foot XR: AP, Lateral, Oblique views     ICD-10-CM    1. Tibialis tendinitis of both lower extremities  M76.821     M76.822          Report: AP, lateral, oblique x-ray of the right foot demonstrates no hardware failures    Impression: No hardware failures   Margo Horan, APRN - CNP           Assessment:   Status post right subtalar and talonavicular joint fusions with Achilles lengthening.      Plan:   3 This is stable chronic illness/condition  Treatment at this time: Cast was removed, patient will be placed into a cam walker boot as a matter medical necessity where she may begin to bear weight as the patient can tolerate and swelling allows.  The affected extremity may now get wet including showering and soaking, recommendation of Epsom salts was given to help with additional incisional healing as well as swelling purposes.  She was

## 2024-02-14 NOTE — PROGRESS NOTES
Patient was given an extra Boot Sock.  The patient was prescribed a walker boot for the patient's right foot. The patient wears a size NA shoe and I fitted them with a M size boot. The patient was fitted and instructed on the use of prescribed walker boot. I explained how to fit themselves and that the plastic flexible piece should always be on the front of the boot and secured by the Velcro straps on top. The air bladder in the boot was adjusted according to proper fit and comfort. The patient walked a short distance and acknowledged satisfaction with current fit. I also explained that they need a heel lift or a higher heeled shoe for the uninvolved LE to help normalize gait and avoid excessive low back stress/strain due to leg length inequality created from walker boot.Patient read and signed documenting they understand and agree to [facility]'s current DME return policy.

## 2024-02-15 ENCOUNTER — PATIENT MESSAGE (OUTPATIENT)
Dept: ORTHOPEDIC SURGERY | Age: 29
End: 2024-02-15

## 2024-02-15 DIAGNOSIS — M76.821 TIBIALIS TENDINITIS OF BOTH LOWER EXTREMITIES: Primary | ICD-10-CM

## 2024-02-15 DIAGNOSIS — M76.822 TIBIALIS TENDINITIS OF BOTH LOWER EXTREMITIES: Primary | ICD-10-CM

## 2024-02-16 RX ORDER — SULFAMETHOXAZOLE AND TRIMETHOPRIM 800; 160 MG/1; MG/1
1 TABLET ORAL 2 TIMES DAILY
Qty: 14 TABLET | Refills: 0 | Status: SHIPPED | OUTPATIENT
Start: 2024-02-16 | End: 2024-02-23

## 2024-02-16 NOTE — TELEPHONE ENCOUNTER
From: Kristy Muhammad  To: Margo Horan  Sent: 2/15/2024 10:04 PM EST  Subject: My foot     The tape is coming off and this is what it looks like. Just wondering if that’s OK

## 2024-02-28 ENCOUNTER — OFFICE VISIT (OUTPATIENT)
Dept: ORTHOPEDIC SURGERY | Age: 29
End: 2024-02-28

## 2024-02-28 DIAGNOSIS — M76.822 TIBIALIS TENDINITIS OF BOTH LOWER EXTREMITIES: Primary | ICD-10-CM

## 2024-02-28 DIAGNOSIS — M76.821 TIBIALIS TENDINITIS OF BOTH LOWER EXTREMITIES: Primary | ICD-10-CM

## 2024-02-28 PROCEDURE — 99024 POSTOP FOLLOW-UP VISIT: CPT | Performed by: NURSE PRACTITIONER

## 2024-02-28 NOTE — PROGRESS NOTES
Name: Kristy Muhammad  YOB: 1995  Gender: female  MRN: [id number]    Procedure Performed:  Right triple hemisection tendo Achilles lengthening  Right subtalar joint arthrodesis  Right talonavicular joint arthrodesis        Date of Procedure: 01/11/2024      Subjective: Patient is here for a wound check today as she is concerned about potential infection of her foot, and overall slow progress of her wound healing.      Physical Examination: Both medial and lateral incisional areas are not fully healed, there is some scabs as well as granulation tissue present.  She does have palpable pulses and intact sensation to the foot.  There is no foul odor or drainage coming from the incisions today.  There is some mild erythema around the medial unhealed incision which is the larger of the 2.        Imaging:   No imaging reviewed          Assessment:   Status post right triple arthrodesis with Achilles lengthening.      Plan:   3 This is stable chronic illness/condition  Treatment at this time:  Patient will continue wear the walker boot as a matter medical necessity where she will continue to bear weight on the affected extremity as she can tolerate and swelling allows.  She will continue to soak with Epsom salts daily as well as apply Silvadene cream to the affected incisional areas.  She will continue to update us through [employer] with pictures regularly.  She may elevate if she can for swelling.  She will follow-up in approximately 4 weeks or sooner if needed with x-ray.  Studies ordered: Foot XR needed @ Next Visit    Weight-bearing status: WBAT in Boot/hardsole shoe        Return to work/work restrictions: none  No medications given

## 2024-03-20 ENCOUNTER — OFFICE VISIT (OUTPATIENT)
Dept: ORTHOPEDIC SURGERY | Age: 29
End: 2024-03-20

## 2024-03-20 DIAGNOSIS — M76.822 TIBIALIS TENDINITIS OF BOTH LOWER EXTREMITIES: Primary | ICD-10-CM

## 2024-03-20 DIAGNOSIS — M76.821 TIBIALIS TENDINITIS OF BOTH LOWER EXTREMITIES: Primary | ICD-10-CM

## 2024-03-20 PROCEDURE — 99024 POSTOP FOLLOW-UP VISIT: CPT | Performed by: NURSE PRACTITIONER

## 2024-03-20 NOTE — PROGRESS NOTES
Name: Kristy Muhammad  YOB: 1995  Gender: female  MRN: [id number]    Procedure Performed:  Right triple hemisection tendo Achilles lengthening  Right subtalar joint arthrodesis  Right talonavicular joint arthrodesis        Date of Procedure: 01/11/2024      Subjective: Patient feels much better about the healing of her wounds at today's visit and is very pleased with the progress that she has made with following postoperative instructions to help with this.  She does report that she does have some bottom of the heel and posterior heel pain.      Physical Examination: Lateral subtalar incision is finally healed without signs of infection.  The medial TN joint incision is much smaller and is roughly nickel in size, does not have any drainage during today's examination and does not show any signs of infection.  She has no subtalar motion as expected through range of motion and the tibiotalar motion is slightly stiff.        Imaging:   Interpretation of imaging  Right foot XR: AP, Lateral, Oblique views     ICD-10-CM    1. 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  We did discuss the possibility that since the subtalar joint is now fused that in the future there could potentially be a need for the hardware to be removed.  We discussed the recovery process and the patient was agreeable to this if need be at some point in the future.      Plan:   3 This is stable chronic illness/condition  Treatment at this time:  Patient may begin to wean from walker boot back in a comfortable shoes as she feels she can tolerate and swelling allows.  She will continue the  Thank you for allowing us the privilege of caring for you. We hope we provided you with the excellent service you deserve.    Please let us know if there is anything else we can do for you so that we can be sure you are completely satisfied with your care experience.    Your visit was with Dr. Slade today.    Instructions per today's visit:  --we are advancing Wegovy as tolerated; plan will be to go ahead with the 0.5mg pens after you finish your current prescription of 0.25mg pens. I placed the new order.  --Please return with Lauren Bloch in 4-6 wks, with Dr. Slade in 2-3 mo    Please call our contact center at [phone] to schedule your next appointments.    Meal Replacement Products:    Here is the link to our new e-store where you can purchase our meal replacement products    [email or web address]    The one week starter kit is a great way to sample a variety of products and see what works for you.    If you want more information about the product go to: Cold Futures    Free Shipping for orders over $75    Benefits of meal replacements products:    Portion and calorie control  Improved nutrition  Structured eating  Simplified food choices  Avoid contact with trigger foods    Interested in working with a health ?  Health coaches work with you to improve your overall health and wellbeing.  They look at the whole person, and may involve discussion of different areas of life, including, but not limited to the four pillars of health (sleep, exercise, nutrition, and stress management). Discuss with your care team if you would like to start working a health .    Health Coaching-3 Pack: Schedule by calling [phone]    $99 for three health coaching visits    Visits may be done in person or via phone    Coaching is a partnership between the  and the client; Coaches do not prescribe or diagnose    Coaching helps inspire the client to reach his/her  personal goals    Bluetooth Scale:    We hope to provide you with high quality telephone and virtual healthcare visits while social distancing for COVID-19 is necessary, as well as in the future when virtual visits may be more convenient for you.    Our technology team made it possible for Bluetooth scales to send weight measurements to our electronic medical record. This allows weights from you weighing at home to securely flow into the medical record, which will improve telephone and virtual visits.  Additionally, studies have shown that adults actually lose more weight when their weights are automatically sent to someone else, and also that this process is not stressful for those adults.    Below is a link for purchasing the scale, with a discount code for our patients. You may call your insurance company to see if they will reimburse you for the cost of the scale, as a piece of durable medical equipment. The scales only go up to a weight of 400 pounds. This is an issue and we are working with the developer on increasing this. We found no scales that go over 400lb that have blue-tooth for connecting to CNG-One.    Scale to purchase: the Language Systems  Body  Scale: [email or web address]=aw.ds    Discount Code: We have a discount code for our patients to bring the cost down to $50, the code is:  withmed     Steps to link the scale to CNG-One via an Android Phone (you can always disconnect at any point in the future):  1. The order must be placed first before the patient can access Track My Health within CNG-One.  2. Download Google Fit robby from the Google Play Store  a. Log in or register using your Google account  3. Download the CNG-One robby from Google Play Store  a. Select add organization  b. Search for [employer] and select it  c. Log into CNG-One  d. Select Track My Health  e. Select the green connect my account button  f. When  prompted log into your Google account  g. Select okay to confirm the account  4. Download the [employer] Health Mate junaid from Google Play Store  a. [place] [facility]  b. Go to profile  c. Tap google fit under the Apps section  d. Select the option to activate Google Fit integration  e. Select the same Google account  f. Select okay to confirm the account  g.  Steps to link the scale to [employer] via an iPhone (you can always disconnect at any point in the future):  **Note Ipropertyz is not available for download on an iPad**  1. The order must be placed first before the patient can access Track My Health within [employer].  2. Locate the Health junaid on your iPhone.  a. Set up your Apple Health account as prompted  b. The Sources page will show Apps that communicate with your Health junaid. Once all steps are completed, you should see [employer] and [employer] listed under the Apps section and your iPhone under the devices section.  i. Select Health Mate  1. Under 'ALLOW  HEALTH Act[employer]avo  TO WRITE DATA ensure the toggle is on for Weight.  2. This will allow the scale to add your weight to the Apple Health  ii. Select [employer]  1. Under 'ALLOW  Octapoly  TO READ DATA ensure the toggle is on for Weight.  2. This allows Mystery Science to grab the weight from Ipropertyz so your provider can see your weights.  3. Download the Tandem Transitt junaid from the Junaid Store  a. Select add organization                                                  b. Search for "[employer] and select it  c. Log into Mystery Science  d. Select Track My Health  e. Select the green connect my account button  f. Follow prompts to link your device to [employer].  4. Download the Withings health mate junaid in the Junaid Store  a. [place] [facility]  b. Go to profile  c. Tap Health under the Apps section  d. If prompted to allow access with the Health Junaid, toggle weight on for read and write access.      For any questions/concerns contact Alba Barragan    To schedule appointments with our team,  please call [phone]    Please call during clinic hours Monday through Friday 8:00a - 4:00p if you have questions or you can contact us via [employer] at anytime.      Lab results will be communicated through My Chart or letter (if My Chart not used). Please call the clinic if you have not received communication after 1 week or if you have any questions.    Clinic Fax: [phone]    Thank you,  Medical Weight Management Team

## 2024-04-10 ENCOUNTER — CLINICAL DOCUMENTATION (OUTPATIENT)
Dept: ORTHOPEDIC SURGERY | Age: 29
End: 2024-04-10

## 2024-04-16 ENCOUNTER — TELEPHONE (OUTPATIENT)
Dept: ORTHOPEDIC SURGERY | Age: 29
End: 2024-04-16

## 2024-04-19 ENCOUNTER — CLINICAL DOCUMENTATION (OUTPATIENT)
Dept: ORTHOPEDIC SURGERY | Age: 29
End: 2024-04-19

## 2024-05-03 ENCOUNTER — CLINICAL DOCUMENTATION (OUTPATIENT)
Dept: ORTHOPEDIC SURGERY | Age: 29
End: 2024-05-03

## 2024-05-08 ENCOUNTER — CLINICAL DOCUMENTATION (OUTPATIENT)
Dept: ORTHOPEDIC SURGERY | Age: 29
End: 2024-05-08

## 2024-05-10 ENCOUNTER — CLINICAL DOCUMENTATION (OUTPATIENT)
Dept: ORTHOPEDIC SURGERY | Age: 29
End: 2024-05-10

## 2024-06-11 ENCOUNTER — TELEPHONE (OUTPATIENT)
Dept: ORTHOPEDIC SURGERY | Age: 29
End: 2024-06-11

## 2024-06-11 NOTE — TELEPHONE ENCOUNTER
Pt said she got a call from someone to move her appt to 10:40 on 06/26. Pt says 10: 40 works for her.

## 2024-06-26 ENCOUNTER — OFFICE VISIT (OUTPATIENT)
Dept: ORTHOPEDIC SURGERY | Age: 29
End: 2024-06-26
Payer: COMMERCIAL

## 2024-06-26 DIAGNOSIS — M76.822 TIBIALIS TENDINITIS OF BOTH LOWER EXTREMITIES: Primary | ICD-10-CM

## 2024-06-26 DIAGNOSIS — T84.84XA PAINFUL ORTHOPAEDIC HARDWARE (HCC): ICD-10-CM

## 2024-06-26 DIAGNOSIS — M76.821 TIBIALIS TENDINITIS OF BOTH LOWER EXTREMITIES: Primary | ICD-10-CM

## 2024-06-26 PROCEDURE — 99214 OFFICE O/P EST MOD 30 MIN: CPT | Performed by: ORTHOPAEDIC SURGERY

## 2024-06-26 NOTE — PROGRESS NOTES
Name: Kristy Muhammad  YOB: 1995  Gender: female  MRN: [id number]    Summary:   Right triple arthrodesis with calcaneus hardware pain       CC: Follow-up (1. Right triple hemisection tendo Achilles lengthening/2. Right subtalar joint arthrodesis/3. Right talonavicular joint arthrodesis/DOS: 1/11/24 , xray obtained.)       HPI: Kristy Muhammad is a 28 y.o. female who presents with Follow-up (1. Right triple hemisection tendo Achilles lengthening/2. Right subtalar joint arthrodesis/3. Right talonavicular joint arthrodesis/DOS: 1/11/24 , xray obtained.)  .  This patient presents by the office today 6 months out from right triple arthrodesis.  She is making good clinical progress but has persistent pain over the screw in her heel.    History was obtained by Patient     ROS/Meds/PSH/PMH/FH/SH: I personally reviewed the patients standard intake form.  Below are the pertinents    Tobacco:  reports that she has never smoked. She has never used smokeless tobacco.  Diabetes: None      Physical Examination:    Exam of the right lower extremity shows very little swelling.  He has expected stiffness.  Incisions are healing well.  She does have tenderness palpation of the palpable screw head in the calcaneus.    Imaging:   Interpretation of imaging  Right foot XR: AP, Lateral, Oblique views     ICD-10-CM    1. Tibialis tendinitis of both lower extremities  M76.821 XR FOOT RIGHT (MIN 3 VIEWS)    M76.822       2. Painful orthopaedic hardware ([facility])  T84.84XA          Report: AP, lateral, oblique x-ray of the right foot demonstrates no hardware failure    Impression: No hardware failure   JOHNATHAN STANLEY III, MD           Assessment:   Right calcaneus hardware pain    Treatment Plan:   4 This is a chronic illness/condition with exacerbation and progression  Treatment at this time: Elective major surgery with procedural risk factors  Studies ordered: NO XR needed @ Next Visit    Weight-bearing status: WBAT

## 2024-06-28 DIAGNOSIS — T84.84XA PAINFUL ORTHOPAEDIC HARDWARE (HCC): Primary | ICD-10-CM

## 2024-07-01 PROBLEM — T84.84XA PAINFUL ORTHOPAEDIC HARDWARE (HCC): Status: ACTIVE | Noted: 2024-06-28

## 2024-07-08 RX ORDER — METHOCARBAMOL 500 MG/1
500 TABLET, FILM COATED ORAL 3 TIMES DAILY PRN
COMMUNITY
Start: 2024-06-19

## 2024-07-08 NOTE — PROGRESS NOTES
Patient verified name and .    Order for consent found in EHR and matches case posting; patient verifies procedure.     Type 1B surgery, phone assessment complete.  Orders received.  Labs per surgeon: NONE  Labs per anesthesia protocol: Hemoglobin held DOS (pt unable to come prior)    Patient answered medical/surgical history questions at their best of ability. All prior to admission medications documented in EPIC.    Patient instructed to continue taking all prescription medications up to the day of surgery but to take only the following medications the day of surgery according to anesthesia guidelines with a small sip of water: Propranolol. Also, patient is requested to take 2 Tylenol in the morning and then again before bed on the day before surgery. Regular or extra strength may be used.       Patient informed that all vitamins and supplements should be held 7 days prior to surgery and NSAIDS 5 days prior to surgery. Prescription meds to hold: Excedrin-start holding now.    Patient instructed on the following:    > Arrive at [facility]PC Entrance, time of arrival to be called the day before by 1700  > NPO after midnight, unless otherwise indicated, including gum, mints, and ice chips  > Responsible adult must drive patient to the hospital, stay during surgery, and patient will need supervision 24 hours after anesthesia  > Use non moisturizing soap in shower the night before surgery and on the morning of surgery  > All piercings must be removed prior to arrival.    > Leave all valuables (money and jewelry) at home but bring insurance card and ID on DOS.   > You may be required to pay a deductible or co-pay on the day of your procedure. You can pre-pay by calling [phone] if your surgery is at the [facility] or [phone] if your surgery is at the [facility].  > Do not wear make-up, nail polish, lotions, cologne, perfumes, powders, or oil on skin. Artificial nails are not permitted.

## 2024-07-10 ENCOUNTER — CLINICAL DOCUMENTATION (OUTPATIENT)
Dept: ORTHOPEDIC SURGERY | Age: 29
End: 2024-07-10

## 2024-07-10 NOTE — PERIOP NOTE
Preop department called to notify patient of arrival time for scheduled procedure. Instructions given to   - Arrive at OPC Entrance [address].  - Remain NPO after midnight, unless otherwise indicated, including gum, mints, and ice chips.   - Have a responsible adult to drive patient to the hospital, stay during surgery, and patient will need supervision 24 hours after anesthesia.   - Use antibacterial soap in shower the night before surgery and on the morning of surgery.       Was patient contacted: yes, pt  Voicemail left: n/a  Numbers contacted: [phone]   Arrival time: 1030

## 2024-07-11 ENCOUNTER — ANESTHESIA (OUTPATIENT)
Dept: SURGERY | Age: 29
End: 2024-07-11
Payer: COMMERCIAL

## 2024-07-11 ENCOUNTER — APPOINTMENT (OUTPATIENT)
Dept: GENERAL RADIOLOGY | Age: 29
End: 2024-07-11
Attending: ORTHOPAEDIC SURGERY
Payer: COMMERCIAL

## 2024-07-11 ENCOUNTER — HOSPITAL ENCOUNTER (OUTPATIENT)
Age: 29
Setting detail: OUTPATIENT SURGERY
Discharge: HOME | End: 2024-07-11
Attending: ORTHOPAEDIC SURGERY | Admitting: ORTHOPAEDIC SURGERY
Payer: COMMERCIAL

## 2024-07-11 ENCOUNTER — ANESTHESIA EVENT (OUTPATIENT)
Dept: SURGERY | Age: 29
End: 2024-07-11
Payer: COMMERCIAL

## 2024-07-11 ENCOUNTER — CLINICAL DOCUMENTATION (OUTPATIENT)
Dept: ORTHOPEDIC SURGERY | Age: 29
End: 2024-07-11

## 2024-07-11 VITALS
RESPIRATION RATE: 20 BRPM | TEMPERATURE: 98.5 F | BODY MASS INDEX: 19.46 KG/M2 | DIASTOLIC BLOOD PRESSURE: 77 MMHG | WEIGHT: 114 LBS | HEIGHT: 64 IN | OXYGEN SATURATION: 100 % | HEART RATE: 70 BPM | SYSTOLIC BLOOD PRESSURE: 119 MMHG

## 2024-07-11 DIAGNOSIS — G89.18 ACUTE POSTOPERATIVE PAIN: Primary | ICD-10-CM

## 2024-07-11 LAB
HCG UR QL: NEGATIVE
HGB BLD-MCNC: 11.8 G/DL (ref 11.7–15.4)

## 2024-07-11 PROCEDURE — 6360000002 HC RX W HCPCS: Performed by: NURSE ANESTHETIST, CERTIFIED REGISTERED

## 2024-07-11 PROCEDURE — 6360000002 HC RX W HCPCS: Performed by: NURSE PRACTITIONER

## 2024-07-11 PROCEDURE — 7100000000 HC PACU RECOVERY - FIRST 15 MIN: Performed by: ORTHOPAEDIC SURGERY

## 2024-07-11 PROCEDURE — 7100000001 HC PACU RECOVERY - ADDTL 15 MIN: Performed by: ORTHOPAEDIC SURGERY

## 2024-07-11 PROCEDURE — 3600000002 HC SURGERY LEVEL 2 BASE: Performed by: ORTHOPAEDIC SURGERY

## 2024-07-11 PROCEDURE — 6360000002 HC RX W HCPCS: Performed by: ORTHOPAEDIC SURGERY

## 2024-07-11 PROCEDURE — 7100000010 HC PHASE II RECOVERY - FIRST 15 MIN: Performed by: ORTHOPAEDIC SURGERY

## 2024-07-11 PROCEDURE — 3600000012 HC SURGERY LEVEL 2 ADDTL 15MIN: Performed by: ORTHOPAEDIC SURGERY

## 2024-07-11 PROCEDURE — 2580000003 HC RX 258: Performed by: ANESTHESIOLOGY

## 2024-07-11 PROCEDURE — 85018 HEMOGLOBIN: CPT

## 2024-07-11 PROCEDURE — 2709999900 HC NON-CHARGEABLE SUPPLY: Performed by: ORTHOPAEDIC SURGERY

## 2024-07-11 PROCEDURE — 3700000001 HC ADD 15 MINUTES (ANESTHESIA): Performed by: ORTHOPAEDIC SURGERY

## 2024-07-11 PROCEDURE — 2500000003 HC RX 250 WO HCPCS: Performed by: NURSE ANESTHETIST, CERTIFIED REGISTERED

## 2024-07-11 PROCEDURE — 81025 URINE PREGNANCY TEST: CPT

## 2024-07-11 PROCEDURE — 3700000000 HC ANESTHESIA ATTENDED CARE: Performed by: ORTHOPAEDIC SURGERY

## 2024-07-11 RX ORDER — SULFAMETHOXAZOLE AND TRIMETHOPRIM 800; 160 MG/1; MG/1
1 TABLET ORAL 2 TIMES DAILY
Qty: 6 TABLET | Refills: 0 | Status: SHIPPED | OUTPATIENT
Start: 2024-07-11 | End: 2024-07-14

## 2024-07-11 RX ORDER — PROPOFOL 10 MG/ML
INJECTION, EMULSION INTRAVENOUS PRN
Status: DISCONTINUED | OUTPATIENT
Start: 2024-07-11 | End: 2024-07-11 | Stop reason: SDUPTHER

## 2024-07-11 RX ORDER — HYDROMORPHONE HYDROCHLORIDE 2 MG/ML
0.5 INJECTION, SOLUTION INTRAMUSCULAR; INTRAVENOUS; SUBCUTANEOUS EVERY 5 MIN PRN
Status: DISCONTINUED | OUTPATIENT
Start: 2024-07-11 | End: 2024-07-11 | Stop reason: HOSPADM

## 2024-07-11 RX ORDER — OXYCODONE HYDROCHLORIDE 5 MG/1
5 TABLET ORAL EVERY 6 HOURS PRN
Qty: 20 TABLET | Refills: 0 | Status: SHIPPED | OUTPATIENT
Start: 2024-07-11 | End: 2024-07-16

## 2024-07-11 RX ORDER — SODIUM CHLORIDE 0.9 % (FLUSH) 0.9 %
5-40 SYRINGE (ML) INJECTION EVERY 12 HOURS SCHEDULED
Status: DISCONTINUED | OUTPATIENT
Start: 2024-07-11 | End: 2024-07-11 | Stop reason: HOSPADM

## 2024-07-11 RX ORDER — MIDAZOLAM HYDROCHLORIDE 2 MG/2ML
2 INJECTION, SOLUTION INTRAMUSCULAR; INTRAVENOUS
Status: DISCONTINUED | OUTPATIENT
Start: 2024-07-11 | End: 2024-07-11 | Stop reason: HOSPADM

## 2024-07-11 RX ORDER — SODIUM CHLORIDE 0.9 % (FLUSH) 0.9 %
5-40 SYRINGE (ML) INJECTION PRN
Status: DISCONTINUED | OUTPATIENT
Start: 2024-07-11 | End: 2024-07-11 | Stop reason: HOSPADM

## 2024-07-11 RX ORDER — LIDOCAINE HYDROCHLORIDE 20 MG/ML
INJECTION, SOLUTION EPIDURAL; INFILTRATION; INTRACAUDAL; PERINEURAL PRN
Status: DISCONTINUED | OUTPATIENT
Start: 2024-07-11 | End: 2024-07-11 | Stop reason: SDUPTHER

## 2024-07-11 RX ORDER — SODIUM CHLORIDE, SODIUM LACTATE, POTASSIUM CHLORIDE, CALCIUM CHLORIDE 600; 310; 30; 20 MG/100ML; MG/100ML; MG/100ML; MG/100ML
INJECTION, SOLUTION INTRAVENOUS CONTINUOUS
Status: DISCONTINUED | OUTPATIENT
Start: 2024-07-11 | End: 2024-07-11 | Stop reason: HOSPADM

## 2024-07-11 RX ORDER — OXYCODONE HYDROCHLORIDE 5 MG/1
5 TABLET ORAL
Status: DISCONTINUED | OUTPATIENT
Start: 2024-07-11 | End: 2024-07-11 | Stop reason: HOSPADM

## 2024-07-11 RX ORDER — NALOXONE HYDROCHLORIDE 0.4 MG/ML
INJECTION, SOLUTION INTRAMUSCULAR; INTRAVENOUS; SUBCUTANEOUS PRN
Status: DISCONTINUED | OUTPATIENT
Start: 2024-07-11 | End: 2024-07-11 | Stop reason: HOSPADM

## 2024-07-11 RX ORDER — SODIUM CHLORIDE 9 MG/ML
INJECTION, SOLUTION INTRAVENOUS PRN
Status: DISCONTINUED | OUTPATIENT
Start: 2024-07-11 | End: 2024-07-11 | Stop reason: HOSPADM

## 2024-07-11 RX ORDER — BUPIVACAINE HYDROCHLORIDE 5 MG/ML
INJECTION, SOLUTION EPIDURAL; INTRACAUDAL PRN
Status: DISCONTINUED | OUTPATIENT
Start: 2024-07-11 | End: 2024-07-11 | Stop reason: ALTCHOICE

## 2024-07-11 RX ORDER — PROCHLORPERAZINE EDISYLATE 5 MG/ML
5 INJECTION INTRAMUSCULAR; INTRAVENOUS
Status: DISCONTINUED | OUTPATIENT
Start: 2024-07-11 | End: 2024-07-11 | Stop reason: HOSPADM

## 2024-07-11 RX ADMIN — LIDOCAINE HYDROCHLORIDE 20 MG: 20 INJECTION, SOLUTION EPIDURAL; INFILTRATION; INTRACAUDAL; PERINEURAL at 12:07

## 2024-07-11 RX ADMIN — PROPOFOL 50 MG: 10 INJECTION, EMULSION INTRAVENOUS at 12:17

## 2024-07-11 RX ADMIN — PROPOFOL 140 MCG/KG/MIN: 10 INJECTION, EMULSION INTRAVENOUS at 12:21

## 2024-07-11 RX ADMIN — PROPOFOL 50 MG: 10 INJECTION, EMULSION INTRAVENOUS at 12:18

## 2024-07-11 RX ADMIN — PROPOFOL 25 MG: 10 INJECTION, EMULSION INTRAVENOUS at 12:08

## 2024-07-11 RX ADMIN — SODIUM CHLORIDE, POTASSIUM CHLORIDE, SODIUM LACTATE AND CALCIUM CHLORIDE: 600; 310; 30; 20 INJECTION, SOLUTION INTRAVENOUS at 11:03

## 2024-07-11 RX ADMIN — PROPOFOL 25 MG: 10 INJECTION, EMULSION INTRAVENOUS at 12:07

## 2024-07-11 RX ADMIN — Medication 2000 MG: at 12:02

## 2024-07-11 ASSESSMENT — PAIN - FUNCTIONAL ASSESSMENT: PAIN_FUNCTIONAL_ASSESSMENT: 0-10

## 2024-07-11 NOTE — ANESTHESIA PRE PROCEDURE
Department of Anesthesiology  Preprocedure Note       Name:  Kristy Muhammad   Age:  28 y.o.  :  1995                                          MRN:  [id number]         Date:  2024      Surgeon: Surgeon(s):  Juliano Mireles III, MD    Procedure: Procedure(s):  Right calcaneus hardware removal    Medications prior to admission:   Prior to Admission medications    Medication Sig Start Date End Date Taking? Authorizing Provider   methocarbamol (ROBAXIN) 500 MG tablet Take 1 tablet by mouth 3 times daily as needed 24  Yes Provider, MD Domitila   aspirin-acetaminophen-caffeine (EXCEDRIN MIGRAINE) 250-250-65 MG per tablet Take 1 tablet at the onset of headaches, may repeat in 2 hours, do not exceed 2 tablets/day, 3 tablets/week, 10 tablets/month. 12/15/23   ProviderDomitila MD   silver sulfADIAZINE (SILVADENE) 1 % cream Apply topically daily. 24   Margo Horan APRN - CNP   propranolol (INDERAL LA) 60 MG extended release capsule Take 1 capsule by mouth daily 12/15/23   ProviderDomitila MD       Current medications:    Current Facility-Administered Medications   Medication Dose Route Frequency Provider Last Rate Last Admin   • ceFAZolin (ANCEF) 2000 mg in sterile water 20 mL IV syringe  2,000 mg IntraVENous On Call to OR Margo Horan APRN - CNP       • lactated ringers IV soln infusion   IntraVENous Continuous Margo Horan APRN - CNP       • sodium chloride flush 0.9 % injection 5-40 mL  5-40 mL IntraVENous 2 times per day Margo Horan APRN - CNP       • sodium chloride flush 0.9 % injection 5-40 mL  5-40 mL IntraVENous PRN Margo Horan APRN - CNP       • 0.9 % sodium chloride infusion   IntraVENous PRN Margo Horan APRN - CNP       • lactated ringers IV soln infusion   IntraVENous Continuous Yokasta Craven MD       • sodium chloride flush 0.9 % injection 5-40 mL  5-40 mL IntraVENous 2 times per day Yokasta Craven

## 2024-07-11 NOTE — OP NOTE
Operative Note    Patient:Kristy Muhammad  MRN: [id number]    Date Of Surgery: 7/11/2024    Surgeon: Juliano Mireles MD    Assistant Surgeon: None    Pre Op Diagnosis:  Pre-Op Diagnosis Codes:     * Painful orthopaedic hardware (HCC) [T84.84XA]      Post Op Diagnosis:   same    Procedures Performed:  Right calcaneus hardware removal, 20680    Implants:   * No implants in log *    Anesthesia:  Monitor Anesthesia Care    Blood Loss:  minimal    Tourniquet:  Estimated calf 10 minutes    Pre Operative Abx:   Ancef 2g            Pre Operative Course:  Kristy Muhammad is a 28 y.o. female who has a history of painful calcaneus hardware.    Operation In Detail:  Patient was evaluated in the preoperative area.  We had a long discussion about the procedure and postoperative protocols.  The patient was then brought back to the operating room suite and placed in the operating room table.  A timeout was taken to identify the patient, procedure being performed, and laterality.  After this the patient was prepped and draped in the normal sterile fashion using a Betadine solution and/or a ChloraPrep solution.  A timeout was then taken to identify the patient his name, date of birth, laterality, and procedure being performed.  We also identified allergies and any concerns about the operation.  Attention was then placed to the operative extremity.    During a preop surgical timeout the right lower extremity was identified as a correct surgical site and prepped and draped in a standard sterile fashions and ChloraPrep solution.  A field blocks obtained with 0.5% plain Marcaine.  A small incision was made at the posterior aspect of the calcaneus.  Underlying screw was identified and removed without difficulty.  The wound was irrigated and closed using nylon sutures.  Sterile dressing was then applied.  Anesthesia was discontinued.  The patient was transferred back to recovery bed.  She was taken recovery in satisfactory

## 2024-07-11 NOTE — H&P
The surgery is planned for the Right calcaneus hardware removal .        History and physical has been reviewed. The patient has been examined. There have been no significant clinical changes since the completion of the originally dated History and Physical.  Patient identified by surgeon; surgical site was confirmed by patient and surgeon.      The patient is here today for outpatient surgery. I have examined the patient, no changes are noted in the patient's medical status. Necessity for the procedure/care is still present and the history and physical above is current.  See the office notes for the full long term history of the problem.  Please see the recent office notes for the musculoskeletal examination.    Signed By: SEAN Suarez CNP     July 11, 2024 10:27 AM

## 2024-07-11 NOTE — DISCHARGE INSTRUCTIONS
that was placed sterilely in the operating room and placed until your first postoperative visit.    If bleeding through your bandage occurs you may reinforce the dressing with additional gauze and an Ace wrap.    PLEASE DO NOT GET THE SURGICAL DRESSING WET.  It is recommended using a snug compressive device such as a cast bag to prevent the dressing from getting wet when bathing if you need assistance in any way with this please call our office.    Nausea and vomiting can be common after surgical procedure.  Please ensure you take narcotics (pain medication) with food.  If the symptoms become severe please call our office.    Fevers may also be common after surgery, it is one of the body's many ways to respond to stress.  If temperatures reach above 101.5 °F please call the office for further instructions.    Minimizing activity levels is highly recommended for the first 48 to 72 hours after surgery.    Elevation of the operative extremity is imperative after surgery.  This will help to decrease swelling, can stop any additional bleeding post surgery and can decrease overall discomfort.  Swelling is a normal finding after surgery and can sometimes be severe elevation is the best and effective way to relieve this.    Please be mindful that the most effective way to elevate the extremity after surgery is at the patient's heart level or above.    If you have any questions or concerns regarding your surgery or recovery please refer back to the commonly asked questions and answer sheet you received at our office before surgery, there are a lot of details within this packet that can be helpful even days after surgery.  If you do not find an answer within this packet please call our office at [phone] or you may send a message through [employer], these messages are reviewed and answered throughout our clinic days Monday through Friday.

## 2024-07-11 NOTE — ANESTHESIA POSTPROCEDURE EVALUATION
Department of Anesthesiology  Postprocedure Note    Patient: Kristy Muhammad  MRN: [id number]  YOB: 1995  Date of evaluation: 7/11/2024    Procedure Summary       Date: 07/11/24 Room / Location: [facility] OP OR 02 / [facility]    Anesthesia Start: 1202 Anesthesia Stop: 1237    Procedure: Right calcaneus hardware removal (Right: Foot) Diagnosis:       Painful orthopaedic hardware (HCC)      (Painful orthopaedic hardware (HCC) [T84.84XA])    Surgeons: Juliano Mireles III, MD Responsible Provider: Yokasta Craven MD    Anesthesia Type: TIVA ASA Status: 2            Anesthesia Type: No value filed.    Berna Phase I: Berna Score: 8    Berna Phase II:      Anesthesia Post Evaluation    Patient location during evaluation: PACU  Patient participation: complete - patient participated  Level of consciousness: awake and alert  Airway patency: patent  Nausea & Vomiting: no nausea and no vomiting  Cardiovascular status: hemodynamically stable  Respiratory status: acceptable, nonlabored ventilation and spontaneous ventilation  Hydration status: euvolemic  Comments: /77   Pulse 70   Temp 98.5 °F (36.9 °C) (Temporal)   Resp 20   Ht 1.626 m (5' 4\")   Wt 51.7 kg (114 lb)   SpO2 100%   BMI 19.57 kg/m²     Multimodal analgesia pain management approach  Pain management: adequate and satisfactory to patient    No notable events documented.

## 2024-07-12 ENCOUNTER — CLINICAL DOCUMENTATION (OUTPATIENT)
Dept: ORTHOPEDIC SURGERY | Age: 29
End: 2024-07-12

## 2024-07-16 ENCOUNTER — CLINICAL DOCUMENTATION (OUTPATIENT)
Dept: ORTHOPEDIC SURGERY | Age: 29
End: 2024-07-16

## 2024-07-17 ENCOUNTER — CLINICAL DOCUMENTATION (OUTPATIENT)
Dept: ORTHOPEDIC SURGERY | Age: 29
End: 2024-07-17

## 2024-07-17 ENCOUNTER — TELEPHONE (OUTPATIENT)
Dept: ORTHOPEDIC SURGERY | Age: 29
End: 2024-07-17

## 2024-07-17 NOTE — TELEPHONE ENCOUNTER
Spoke with patient. I refaxed her [employer] paperwork and wrote a new return to work date for 09/09/2024.

## 2024-07-17 NOTE — TELEPHONE ENCOUNTER
Patient is asking if [employer] form RTW date can be changed to 9-9-24 for them to reevaluate? Also she has question concerning her bandage. The Andressa form is in under media tab if you need to see it.

## 2024-07-18 ENCOUNTER — CLINICAL DOCUMENTATION (OUTPATIENT)
Dept: ORTHOPEDIC SURGERY | Age: 29
End: 2024-07-18

## 2024-07-22 ENCOUNTER — CLINICAL DOCUMENTATION (OUTPATIENT)
Dept: ORTHOPEDIC SURGERY | Age: 29
End: 2024-07-22

## 2024-07-26 ENCOUNTER — OFFICE VISIT (OUTPATIENT)
Dept: ORTHOPEDIC SURGERY | Age: 29
End: 2024-07-26

## 2024-07-26 DIAGNOSIS — T84.84XA PAINFUL ORTHOPAEDIC HARDWARE (HCC): Primary | ICD-10-CM

## 2024-07-26 PROCEDURE — 99024 POSTOP FOLLOW-UP VISIT: CPT | Performed by: NURSE PRACTITIONER

## 2024-07-26 NOTE — PROGRESS NOTES
Name: Kristy Muhammad  YOB: 1995  Gender: female  MRN: [id number]    Procedure Performed: Right calcaneus hardware removal         Date of Procedure:07/11/2024      Subjective: Patient reports overall the heel feels better since screws been removed.  She notes she still has random sensations throughout the foot.      Physical Examination: Posterior incision appears to be healing well shows no signs of infection.  She does have palpable pulses and intact sensation to the foot.  There is localized swelling to the posterior heel.  She is able to wiggle all toes effectively without pain.         Imaging:   No imaging reviewed          Assessment:   Status post right calcaneal hardware removal after triple arthrodesis.      Plan:   3 This is stable chronic illness/condition  Treatment at this time: Sutures were removed, Steri-Strips were placed.  Patient may begin to wean from the assistive device that they can tolerate can tolerate and swelling allows. She Will continue to elevate the affected extremity as needed for swelling, ice can also be effective. She May now get the affected extremity wet including showering and soaking as needed, recommendation of Epsom salts was given to help with additional incisional healing as well as swelling purposes.  Physical activities may be resumed as the patient can tolerate excluding anything high-impact at this time.  She May continue home exercise program to increase strength mobility of the affected foot and ankle.  Follow-up will be in approximately 4 weeks or sooner if needed    Studies ordered: NO XR needed @ Next Visit    Weight-bearing status: WBAT        Return to work/work restrictions: none  No medications given

## 2024-08-21 ENCOUNTER — TELEPHONE (OUTPATIENT)
Dept: ORTHOPEDIC SURGERY | Age: 29
End: 2024-08-21

## 2024-08-21 ENCOUNTER — OFFICE VISIT (OUTPATIENT)
Dept: ORTHOPEDIC SURGERY | Age: 29
End: 2024-08-21

## 2024-08-21 DIAGNOSIS — T84.84XA PAINFUL ORTHOPAEDIC HARDWARE (HCC): Primary | ICD-10-CM

## 2024-08-21 DIAGNOSIS — M76.822 TIBIALIS TENDINITIS OF BOTH LOWER EXTREMITIES: ICD-10-CM

## 2024-08-21 DIAGNOSIS — M76.821 TIBIALIS TENDINITIS OF BOTH LOWER EXTREMITIES: ICD-10-CM

## 2024-08-21 PROCEDURE — 99024 POSTOP FOLLOW-UP VISIT: CPT | Performed by: NURSE PRACTITIONER

## 2024-08-21 NOTE — TELEPHONE ENCOUNTER
She wants to know if patient has been released to return to work. I did not see a release note. Please let her know.  She also wants to know when the hardware was initially put In.

## 2024-08-21 NOTE — PROGRESS NOTES
Name: Kristy Muhammad  YOB: 1995  Gender: female  MRN: [id number]    Procedure Performed: Right calcaneus hardware removal            Date of Procedure:07/11/2024      Subjective: Patient reports that overall she is doing well.  She does note that she has on occasion pain over the medial foot incision and zinger like sensations to the lateral foot but overall this is tolerable.  She notes that she has been gaining strength and mobility through home exercises and feels like this has been beneficial for her.      Physical Examination: The incisional areas are all well-healed.  There are no signs of infection to the foot or ankle today.  She has palpable pulses and intact sensation to the foot.  Swelling is continuing to resolve.  She has minimal talar tilt testing as expected.  Tibiotalar motion is little stiff today but she is able to perform this without pain.        Imaging:   No imaging reviewed          Assessment:   Status post right calcaneal hardware removal.      Plan:   3 This is stable chronic illness/condition  Treatment at this time: Time with no intervention, she will follow-up on a as needed basis.  Studies ordered: NO XR needed @ Next Visit    Weight-bearing status: WBAT        Return to work/work restrictions:  Patient return to work September 9, 2024 without restrictions.  No medications given

## 2025-06-18 ENCOUNTER — OFFICE VISIT (OUTPATIENT)
Dept: ORTHOPEDIC SURGERY | Age: 30
End: 2025-06-18
Payer: COMMERCIAL

## 2025-06-18 DIAGNOSIS — M76.822 TIBIALIS TENDINITIS OF BOTH LOWER EXTREMITIES: Primary | ICD-10-CM

## 2025-06-18 DIAGNOSIS — M76.821 TIBIALIS TENDINITIS OF BOTH LOWER EXTREMITIES: Primary | ICD-10-CM

## 2025-06-18 DIAGNOSIS — T84.84XA PAINFUL ORTHOPAEDIC HARDWARE: ICD-10-CM

## 2025-06-18 PROCEDURE — 99214 OFFICE O/P EST MOD 30 MIN: CPT | Performed by: ORTHOPAEDIC SURGERY

## 2025-06-18 RX ORDER — MELOXICAM 7.5 MG/1
7.5 TABLET ORAL DAILY
Qty: 30 TABLET | Refills: 3 | Status: SHIPPED | OUTPATIENT
Start: 2025-06-18

## 2025-06-18 NOTE — PROGRESS NOTES
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 female who presents with Follow-up (B Foot and Ankle Pain Recheck )  .  This patient returns back the office today with increased complaints of pain located in her left ankle.  She is doing relatively well with her right after triple arthrodesis.    History was obtained by Patient     ROS/Meds/PSH/PMH/FH/SH: I personally reviewed the patients standard intake form.  Below are the pertinents    Tobacco:  reports that she has never smoked. She has never used smokeless tobacco.  Diabetes: None      Physical Examination:    Exam of the right lower extremity shows well-healed surgical incision with expected stiffness and very little pain.  The left shows tender to palpation over the sinus Tarsi and over the lateral column area.  She has palpable pulses and intact sensation.    Imaging:   Interpretation of imaging  Bilateral feet and ankles XR: AP, Lateral, Oblique views     ICD-10-CM    1. Tibialis tendinitis of both lower extremities  M76.821 XR ANKLE STANDARD BILATERAL    M76.822 XR FOOT LIMITED BILATERAL      2. Painful orthopaedic hardware  T84.84XA XR ANKLE STANDARD BILATERAL     XR FOOT LIMITED BILATERAL         Report: AP, lateral, oblique x-ray of the bilateral feet and ankles demonstrates solid right triple arthrodesis, left hindfoot arthritis    Impression: Solid triple arthrodesis on the right, left hindfoot arthritis   JOHNATHAN STANLEY III, MD           Assessment:   Right solid triple arthrodesis, left hindfoot arthritis with history of lateral column lengthening    Treatment Plan:   4 This is a chronic illness/condition with exacerbation and progression  Treatment at this time: Prescription Drug Management and Elective

## 2025-08-06 SDOH — HEALTH STABILITY: PHYSICAL HEALTH: ON AVERAGE, HOW MANY MINUTES DO YOU ENGAGE IN EXERCISE AT THIS LEVEL?: 0 MIN

## 2025-08-06 SDOH — HEALTH STABILITY: PHYSICAL HEALTH: ON AVERAGE, HOW MANY DAYS PER WEEK DO YOU ENGAGE IN MODERATE TO STRENUOUS EXERCISE (LIKE A BRISK WALK)?: 0 DAYS

## 2025-08-07 ENCOUNTER — OFFICE VISIT (OUTPATIENT)
Dept: FAMILY MEDICINE CLINIC | Facility: CLINIC | Age: 30
End: 2025-08-07
Payer: COMMERCIAL

## 2025-08-07 VITALS
WEIGHT: 120.4 LBS | DIASTOLIC BLOOD PRESSURE: 64 MMHG | RESPIRATION RATE: 16 BRPM | OXYGEN SATURATION: 98 % | HEART RATE: 90 BPM | HEIGHT: 64 IN | SYSTOLIC BLOOD PRESSURE: 104 MMHG | BODY MASS INDEX: 20.55 KG/M2

## 2025-08-07 DIAGNOSIS — G89.29 CHRONIC MIDLINE LOW BACK PAIN WITHOUT SCIATICA: ICD-10-CM

## 2025-08-07 DIAGNOSIS — G80.1 SPASTIC DIPLEGIC CEREBRAL PALSY (HCC): Primary | ICD-10-CM

## 2025-08-07 DIAGNOSIS — D57.40 SICKLE-CELL THALASSEMIA (HCC): ICD-10-CM

## 2025-08-07 DIAGNOSIS — Z12.31 ENCOUNTER FOR SCREENING MAMMOGRAM FOR MALIGNANT NEOPLASM OF BREAST: ICD-10-CM

## 2025-08-07 DIAGNOSIS — D69.3 IMMUNE THROMBOCYTOPENIA (HCC): ICD-10-CM

## 2025-08-07 DIAGNOSIS — Z80.3 FAMILY HISTORY OF BREAST CANCER IN MOTHER: ICD-10-CM

## 2025-08-07 DIAGNOSIS — M41.20 IDIOPATHIC SCOLIOSIS AND KYPHOSCOLIOSIS: ICD-10-CM

## 2025-08-07 DIAGNOSIS — M54.50 CHRONIC MIDLINE LOW BACK PAIN WITHOUT SCIATICA: ICD-10-CM

## 2025-08-07 DIAGNOSIS — M76.822 LEFT TIBIALIS TENDONITIS: ICD-10-CM

## 2025-08-07 DIAGNOSIS — F34.1 DYSTHYMIA: ICD-10-CM

## 2025-08-07 PROBLEM — D50.9 IRON DEFICIENCY ANEMIA: Status: ACTIVE | Noted: 2022-08-19

## 2025-08-07 PROBLEM — G43.011 INTRACTABLE MIGRAINE WITHOUT AURA AND WITH STATUS MIGRAINOSUS: Status: ACTIVE | Noted: 2017-07-11

## 2025-08-07 PROBLEM — M76.821 TIBIALIS TENDINITIS OF BOTH LOWER EXTREMITIES: Status: RESOLVED | Noted: 2023-12-28 | Resolved: 2025-08-07

## 2025-08-07 PROBLEM — T84.84XA PAINFUL ORTHOPAEDIC HARDWARE: Status: RESOLVED | Noted: 2024-06-28 | Resolved: 2025-08-07

## 2025-08-07 PROCEDURE — 99204 OFFICE O/P NEW MOD 45 MIN: CPT | Performed by: FAMILY MEDICINE

## 2025-08-07 RX ORDER — PROPRANOLOL HYDROCHLORIDE 160 MG/1
160 CAPSULE, EXTENDED RELEASE ORAL DAILY
COMMUNITY
Start: 2025-07-22

## 2025-08-07 SDOH — ECONOMIC STABILITY: FOOD INSECURITY: WITHIN THE PAST 12 MONTHS, THE FOOD YOU BOUGHT JUST DIDN'T LAST AND YOU DIDN'T HAVE MONEY TO GET MORE.: NEVER TRUE

## 2025-08-07 SDOH — ECONOMIC STABILITY: FOOD INSECURITY: WITHIN THE PAST 12 MONTHS, YOU WORRIED THAT YOUR FOOD WOULD RUN OUT BEFORE YOU GOT MONEY TO BUY MORE.: NEVER TRUE

## 2025-08-07 ASSESSMENT — ENCOUNTER SYMPTOMS
BLOOD IN STOOL: 0
BACK PAIN: 1
NAUSEA: 0
VOMITING: 0
SHORTNESS OF BREATH: 0
ABDOMINAL PAIN: 0
COUGH: 0

## 2025-08-07 ASSESSMENT — PATIENT HEALTH QUESTIONNAIRE - PHQ9
SUM OF ALL RESPONSES TO PHQ QUESTIONS 1-9: 2
1. LITTLE INTEREST OR PLEASURE IN DOING THINGS: NOT AT ALL
SUM OF ALL RESPONSES TO PHQ QUESTIONS 1-9: 2
2. FEELING DOWN, DEPRESSED OR HOPELESS: MORE THAN HALF THE DAYS
SUM OF ALL RESPONSES TO PHQ QUESTIONS 1-9: 2
SUM OF ALL RESPONSES TO PHQ QUESTIONS 1-9: 2

## 2025-09-04 ENCOUNTER — OFFICE VISIT (OUTPATIENT)
Dept: FAMILY MEDICINE CLINIC | Facility: CLINIC | Age: 30
End: 2025-09-04
Payer: COMMERCIAL

## 2025-09-04 VITALS
WEIGHT: 122.2 LBS | RESPIRATION RATE: 16 BRPM | BODY MASS INDEX: 20.86 KG/M2 | OXYGEN SATURATION: 100 % | SYSTOLIC BLOOD PRESSURE: 100 MMHG | HEIGHT: 64 IN | HEART RATE: 82 BPM | DIASTOLIC BLOOD PRESSURE: 62 MMHG

## 2025-09-04 DIAGNOSIS — R10.9 ABDOMINAL CRAMPING: ICD-10-CM

## 2025-09-04 DIAGNOSIS — R07.89 CHEST TIGHTNESS: ICD-10-CM

## 2025-09-04 DIAGNOSIS — R06.02 SOB (SHORTNESS OF BREATH): Primary | ICD-10-CM

## 2025-09-04 DIAGNOSIS — J30.1 SEASONAL ALLERGIC RHINITIS DUE TO POLLEN: ICD-10-CM

## 2025-09-04 DIAGNOSIS — K59.09 CHRONIC CONSTIPATION: ICD-10-CM

## 2025-09-04 LAB
ALBUMIN SERPL-MCNC: 3.9 G/DL (ref 3.5–5)
ALBUMIN/GLOB SERPL: 1 (ref 1–1.9)
ALP SERPL-CCNC: 73 U/L (ref 35–104)
ALT SERPL-CCNC: 35 U/L (ref 8–45)
ANION GAP SERPL CALC-SCNC: 11 MMOL/L (ref 7–16)
AST SERPL-CCNC: 28 U/L (ref 15–37)
BASOPHILS # BLD: 0.04 K/UL (ref 0–0.2)
BASOPHILS NFR BLD: 0.4 % (ref 0–2)
BILIRUB SERPL-MCNC: 0.6 MG/DL (ref 0–1.2)
BUN SERPL-MCNC: 8 MG/DL (ref 6–23)
CALCIUM SERPL-MCNC: 9.6 MG/DL (ref 8.8–10.2)
CHLORIDE SERPL-SCNC: 105 MMOL/L (ref 98–107)
CO2 SERPL-SCNC: 24 MMOL/L (ref 20–29)
CREAT SERPL-MCNC: 0.67 MG/DL (ref 0.6–1.1)
DIFFERENTIAL METHOD BLD: ABNORMAL
EOSINOPHIL # BLD: 0.57 K/UL (ref 0–0.8)
EOSINOPHIL NFR BLD: 6.1 % (ref 0.5–7.8)
ERYTHROCYTE [DISTWIDTH] IN BLOOD BY AUTOMATED COUNT: 13.1 % (ref 11.9–14.6)
GLOBULIN SER CALC-MCNC: 3.9 G/DL (ref 2.3–3.5)
GLUCOSE SERPL-MCNC: 56 MG/DL (ref 70–99)
HCT VFR BLD AUTO: 37.3 % (ref 35.8–46.3)
HGB BLD-MCNC: 11.8 G/DL (ref 11.7–15.4)
IMM GRANULOCYTES # BLD AUTO: 0.03 K/UL (ref 0–0.5)
IMM GRANULOCYTES NFR BLD AUTO: 0.3 % (ref 0–5)
LYMPHOCYTES # BLD: 4.11 K/UL (ref 0.5–4.6)
LYMPHOCYTES NFR BLD: 44.1 % (ref 13–44)
MCH RBC QN AUTO: 23.9 PG (ref 26.1–32.9)
MCHC RBC AUTO-ENTMCNC: 31.6 G/DL (ref 31.4–35)
MCV RBC AUTO: 75.5 FL (ref 82–102)
MONOCYTES # BLD: 0.62 K/UL (ref 0.1–1.3)
MONOCYTES NFR BLD: 6.7 % (ref 4–12)
NEUTS SEG # BLD: 3.94 K/UL (ref 1.7–8.2)
NEUTS SEG NFR BLD: 42.4 % (ref 43–78)
NRBC # BLD: 0 K/UL (ref 0–0.2)
PLATELET # BLD AUTO: 184 K/UL (ref 150–450)
PMV BLD AUTO: 11.4 FL (ref 9.4–12.3)
POTASSIUM SERPL-SCNC: 3.7 MMOL/L (ref 3.5–5.1)
PROT SERPL-MCNC: 7.8 G/DL (ref 6.3–8.2)
RBC # BLD AUTO: 4.94 M/UL (ref 4.05–5.2)
SODIUM SERPL-SCNC: 139 MMOL/L (ref 136–145)
WBC # BLD AUTO: 9.3 K/UL (ref 4.3–11.1)

## 2025-09-04 PROCEDURE — 93000 ELECTROCARDIOGRAM COMPLETE: CPT | Performed by: FAMILY MEDICINE

## 2025-09-04 PROCEDURE — 99214 OFFICE O/P EST MOD 30 MIN: CPT | Performed by: FAMILY MEDICINE

## 2025-09-04 RX ORDER — FLUTICASONE PROPIONATE 50 MCG
2 SPRAY, SUSPENSION (ML) NASAL DAILY
Qty: 1 EACH | Refills: 1 | Status: SHIPPED | OUTPATIENT
Start: 2025-09-04

## 2025-09-04 RX ORDER — CETIRIZINE HYDROCHLORIDE 10 MG/1
10 TABLET ORAL DAILY
Qty: 30 TABLET | Refills: 1 | Status: SHIPPED | OUTPATIENT
Start: 2025-09-04

## 2025-09-04 RX ORDER — ALBUTEROL SULFATE 90 UG/1
2 INHALANT RESPIRATORY (INHALATION) 4 TIMES DAILY PRN
Qty: 18 G | Refills: 5 | Status: SHIPPED | OUTPATIENT
Start: 2025-09-04

## 2025-09-04 ASSESSMENT — ENCOUNTER SYMPTOMS
WHEEZING: 0
CONSTIPATION: 1
SORE THROAT: 0
SHORTNESS OF BREATH: 1
COUGH: 1
SINUS PRESSURE: 0
CHEST TIGHTNESS: 1
RHINORRHEA: 0
NAUSEA: 0
ABDOMINAL PAIN: 1
DIARRHEA: 0
VOMITING: 0
SINUS PAIN: 0

## (undated) DEVICE — SOLUTION IRRIG 1000ML 0.9% SOD CHL USP POUR PLAS BTL

## (undated) DEVICE — SPLINT THMB W4XL30IN FBRGLS PD PRECUT LTWT DURABLE FAST SET

## (undated) DEVICE — BANDAGE GZ W2XL75IN ST RAYON POLY CNFRM STRTCH LTWT

## (undated) DEVICE — GLOVE SURG SZ 65 L12IN FNGR THK79MIL GRN LTX FREE

## (undated) DEVICE — BANDAGE COBAN 6 IN WND 6INX5YD FOAM

## (undated) DEVICE — GOWN,SIRUS,NONRNF,SETINSLV,XL,20/CS: Brand: MEDLINE

## (undated) DEVICE — GLOVE SURG SZ 65 CRM LTX FREE POLYISOPRENE POLYMER BEAD ANTI

## (undated) DEVICE — PRECISION THIN (9.0 X 0.38 X 31.0MM)

## (undated) DEVICE — GLOVE SURG SZ 8 L12IN FNGR THK79MIL GRN LTX FREE

## (undated) DEVICE — DYNAMIC COMPRESSION SYSTEM: Brand: EASYFUSE

## (undated) DEVICE — BANDAGE,ELASTIC,ESMARK,STERILE,4"X9',LF: Brand: MEDLINE

## (undated) DEVICE — SUTURE VCRL SZ 2-0 L27IN ABSRB UD L26MM CT-2 1/2 CIR J269H

## (undated) DEVICE — SHEET,DRAPE,53X77,STERILE: Brand: MEDLINE

## (undated) DEVICE — LARGE TEAR CROSS CUT RASP (14.0 X 7.0MM)

## (undated) DEVICE — BANDAGE COMPR L5YDXW2IN FOAM CO FLX

## (undated) DEVICE — JOINT PREP INSTRUMENT KIT: Brand: ORTHOLOC™ 2

## (undated) DEVICE — FOOT & ANKLE SOFT DR WOMACK: Brand: MEDLINE INDUSTRIES, INC.

## (undated) DEVICE — DRESSING PETRO W3XL8IN OIL EMUL N ADH GZ KNIT IMPREG CELOS

## (undated) DEVICE — C-ARM: Brand: UNBRANDED

## (undated) DEVICE — SPONGE LAPAROTOMY W18XL18IN WHITE STRUNG RADIOPAQUE STERILE

## (undated) DEVICE — PAD,ABDOMINAL,5"X9",ST,LF,25/BX: Brand: MEDLINE INDUSTRIES, INC.

## (undated) DEVICE — PADDING CAST W4INXL4YD ST COT COHESIVE HND TEARABLE SPEC

## (undated) DEVICE — GOWN,PREVENTION PLUS,XLN/XL,ST,24/CS: Brand: MEDLINE

## (undated) DEVICE — FOAM BUMP ROUND LARGE: Brand: MEDLINE INDUSTRIES, INC.

## (undated) DEVICE — FOOT DR TOLLISON & WOMACK: Brand: MEDLINE INDUSTRIES, INC.

## (undated) DEVICE — ZIMMER® STERILE DISPOSABLE TOURNIQUET CUFF WITH PLC, DUAL PORT, SINGLE BLADDER, 18 IN. (46 CM)

## (undated) DEVICE — GUIDE WIRE

## (undated) DEVICE — SUTURE MCRYL SZ 3-0 L27IN ABSRB UD L19MM PS-2 3/8 CIR PRIM Y427H

## (undated) DEVICE — ZIP 16 SURGICAL SKIN CLOSURE DEVICE: Brand: ZIP 16 SURGICAL SKIN CLOSURE DEVICE